# Patient Record
Sex: MALE | Race: WHITE | Employment: OTHER | URBAN - METROPOLITAN AREA
[De-identification: names, ages, dates, MRNs, and addresses within clinical notes are randomized per-mention and may not be internally consistent; named-entity substitution may affect disease eponyms.]

---

## 2015-09-11 LAB
LEFT VENTRICULAR EJECTION FRACTION MODE: NORMAL
LV EF: 65 %

## 2017-02-15 LAB
AVERAGE GLUCOSE: NORMAL
HBA1C MFR BLD: 5.4 %

## 2017-03-07 PROBLEM — Z95.1 S/P CABG X 3: Status: ACTIVE | Noted: 2017-03-07

## 2018-11-26 ENCOUNTER — APPOINTMENT (OUTPATIENT)
Dept: GENERAL RADIOLOGY | Age: 73
DRG: 281 | End: 2018-11-26
Payer: MEDICARE

## 2018-11-26 ENCOUNTER — HOSPITAL ENCOUNTER (INPATIENT)
Age: 73
LOS: 2 days | Discharge: HOME | DRG: 281 | End: 2018-11-28
Attending: EMERGENCY MEDICINE | Admitting: INTERNAL MEDICINE
Payer: MEDICARE

## 2018-11-26 DIAGNOSIS — R94.31 ACUTE ELECTROCARDIOGRAM CHANGES: ICD-10-CM

## 2018-11-26 DIAGNOSIS — R77.8 ELEVATED TROPONIN: Primary | ICD-10-CM

## 2018-11-26 PROBLEM — E78.2 MIXED HYPERLIPIDEMIA: Chronic | Status: ACTIVE | Noted: 2018-11-26

## 2018-11-26 PROBLEM — Z95.1 S/P CABG X 3: Chronic | Status: ACTIVE | Noted: 2017-03-07

## 2018-11-26 PROBLEM — I25.10 CAD (CORONARY ARTERY DISEASE), NATIVE CORONARY ARTERY: Chronic | Status: ACTIVE | Noted: 2018-11-26

## 2018-11-26 PROBLEM — I21.4 NON-STEMI (NON-ST ELEVATED MYOCARDIAL INFARCTION) (HCC): Status: ACTIVE | Noted: 2018-11-26

## 2018-11-26 LAB
ANION GAP SERPL CALCULATED.3IONS-SCNC: 11 MMOL/L (ref 7–16)
BASOPHILS ABSOLUTE: 0.04 E9/L (ref 0–0.2)
BASOPHILS RELATIVE PERCENT: 0.6 % (ref 0–2)
BUN BLDV-MCNC: 13 MG/DL (ref 8–23)
CALCIUM SERPL-MCNC: 9.2 MG/DL (ref 8.6–10.2)
CHLORIDE BLD-SCNC: 105 MMOL/L (ref 98–107)
CK MB: 7.3 NG/ML (ref 0–7.7)
CK MB: 78.1 NG/ML (ref 0–7.7)
CO2: 26 MMOL/L (ref 22–29)
CREAT SERPL-MCNC: 1.2 MG/DL (ref 0.7–1.2)
EOSINOPHILS ABSOLUTE: 0.05 E9/L (ref 0.05–0.5)
EOSINOPHILS RELATIVE PERCENT: 0.7 % (ref 0–6)
GFR AFRICAN AMERICAN: >60
GFR NON-AFRICAN AMERICAN: 59 ML/MIN/1.73
GLUCOSE BLD-MCNC: 119 MG/DL (ref 74–99)
HCT VFR BLD CALC: 44.3 % (ref 37–54)
HEMOGLOBIN: 14.9 G/DL (ref 12.5–16.5)
IMMATURE GRANULOCYTES #: 0.03 E9/L
IMMATURE GRANULOCYTES %: 0.4 % (ref 0–5)
LYMPHOCYTES ABSOLUTE: 1.11 E9/L (ref 1.5–4)
LYMPHOCYTES RELATIVE PERCENT: 16.5 % (ref 20–42)
MCH RBC QN AUTO: 31.5 PG (ref 26–35)
MCHC RBC AUTO-ENTMCNC: 33.6 % (ref 32–34.5)
MCV RBC AUTO: 93.7 FL (ref 80–99.9)
MONOCYTES ABSOLUTE: 0.56 E9/L (ref 0.1–0.95)
MONOCYTES RELATIVE PERCENT: 8.3 % (ref 2–12)
NEUTROPHILS ABSOLUTE: 4.93 E9/L (ref 1.8–7.3)
NEUTROPHILS RELATIVE PERCENT: 73.5 % (ref 43–80)
PDW BLD-RTO: 12.5 FL (ref 11.5–15)
PLATELET # BLD: 183 E9/L (ref 130–450)
PMV BLD AUTO: 9.9 FL (ref 7–12)
POTASSIUM REFLEX MAGNESIUM: 3.8 MMOL/L (ref 3.5–5)
RBC # BLD: 4.73 E12/L (ref 3.8–5.8)
SODIUM BLD-SCNC: 142 MMOL/L (ref 132–146)
TOTAL CK: 176 U/L (ref 20–200)
TOTAL CK: 728 U/L (ref 20–200)
TROPONIN: 0.04 NG/ML (ref 0–0.03)
TROPONIN: 0.56 NG/ML (ref 0–0.03)
WBC # BLD: 6.7 E9/L (ref 4.5–11.5)

## 2018-11-26 PROCEDURE — 6360000002 HC RX W HCPCS: Performed by: INTERNAL MEDICINE

## 2018-11-26 PROCEDURE — 6370000000 HC RX 637 (ALT 250 FOR IP): Performed by: INTERNAL MEDICINE

## 2018-11-26 PROCEDURE — 99223 1ST HOSP IP/OBS HIGH 75: CPT | Performed by: INTERNAL MEDICINE

## 2018-11-26 PROCEDURE — 36415 COLL VENOUS BLD VENIPUNCTURE: CPT

## 2018-11-26 PROCEDURE — 84484 ASSAY OF TROPONIN QUANT: CPT

## 2018-11-26 PROCEDURE — 6370000000 HC RX 637 (ALT 250 FOR IP): Performed by: STUDENT IN AN ORGANIZED HEALTH CARE EDUCATION/TRAINING PROGRAM

## 2018-11-26 PROCEDURE — 71045 X-RAY EXAM CHEST 1 VIEW: CPT

## 2018-11-26 PROCEDURE — 85025 COMPLETE CBC W/AUTO DIFF WBC: CPT

## 2018-11-26 PROCEDURE — 82550 ASSAY OF CK (CPK): CPT

## 2018-11-26 PROCEDURE — 99285 EMERGENCY DEPT VISIT HI MDM: CPT

## 2018-11-26 PROCEDURE — 2580000003 HC RX 258: Performed by: INTERNAL MEDICINE

## 2018-11-26 PROCEDURE — 80048 BASIC METABOLIC PNL TOTAL CA: CPT

## 2018-11-26 PROCEDURE — 2060000000 HC ICU INTERMEDIATE R&B

## 2018-11-26 PROCEDURE — 82553 CREATINE MB FRACTION: CPT

## 2018-11-26 PROCEDURE — 93005 ELECTROCARDIOGRAM TRACING: CPT | Performed by: EMERGENCY MEDICINE

## 2018-11-26 RX ORDER — ACETAMINOPHEN 325 MG/1
650 TABLET ORAL EVERY 4 HOURS PRN
Status: DISCONTINUED | OUTPATIENT
Start: 2018-11-26 | End: 2018-11-27

## 2018-11-26 RX ORDER — ASPIRIN 81 MG/1
81 TABLET, CHEWABLE ORAL DAILY
Status: DISCONTINUED | OUTPATIENT
Start: 2018-11-26 | End: 2018-11-28 | Stop reason: HOSPADM

## 2018-11-26 RX ORDER — LEVOTHYROXINE SODIUM 0.05 MG/1
50 TABLET ORAL DAILY
Status: DISCONTINUED | OUTPATIENT
Start: 2018-11-26 | End: 2018-11-28 | Stop reason: HOSPADM

## 2018-11-26 RX ORDER — ONDANSETRON 2 MG/ML
4 INJECTION INTRAMUSCULAR; INTRAVENOUS EVERY 6 HOURS PRN
Status: DISCONTINUED | OUTPATIENT
Start: 2018-11-26 | End: 2018-11-27

## 2018-11-26 RX ORDER — NITROGLYCERIN 0.4 MG/1
0.4 TABLET SUBLINGUAL ONCE
Status: COMPLETED | OUTPATIENT
Start: 2018-11-26 | End: 2018-11-26

## 2018-11-26 RX ORDER — ASPIRIN 81 MG/1
324 TABLET, CHEWABLE ORAL ONCE
Status: COMPLETED | OUTPATIENT
Start: 2018-11-26 | End: 2018-11-26

## 2018-11-26 RX ORDER — ROSUVASTATIN CALCIUM 5 MG/1
5 TABLET, COATED ORAL
Status: DISCONTINUED | OUTPATIENT
Start: 2018-11-26 | End: 2018-11-28 | Stop reason: HOSPADM

## 2018-11-26 RX ORDER — SODIUM CHLORIDE 0.9 % (FLUSH) 0.9 %
10 SYRINGE (ML) INJECTION EVERY 12 HOURS SCHEDULED
Status: DISCONTINUED | OUTPATIENT
Start: 2018-11-26 | End: 2018-11-27

## 2018-11-26 RX ORDER — SODIUM CHLORIDE 9 MG/ML
INJECTION, SOLUTION INTRAVENOUS CONTINUOUS
Status: DISCONTINUED | OUTPATIENT
Start: 2018-11-26 | End: 2018-11-27

## 2018-11-26 RX ORDER — SODIUM CHLORIDE 0.9 % (FLUSH) 0.9 %
10 SYRINGE (ML) INJECTION PRN
Status: DISCONTINUED | OUTPATIENT
Start: 2018-11-26 | End: 2018-11-27

## 2018-11-26 RX ORDER — NITROGLYCERIN 0.4 MG/1
0.4 TABLET SUBLINGUAL EVERY 5 MIN PRN
Status: DISCONTINUED | OUTPATIENT
Start: 2018-11-26 | End: 2018-11-27

## 2018-11-26 RX ADMIN — METOPROLOL TARTRATE 25 MG: 25 TABLET ORAL at 22:31

## 2018-11-26 RX ADMIN — NITROGLYCERIN 1 INCH: 20 OINTMENT TOPICAL at 14:41

## 2018-11-26 RX ADMIN — ENOXAPARIN SODIUM 40 MG: 40 INJECTION SUBCUTANEOUS at 11:06

## 2018-11-26 RX ADMIN — ASPIRIN 324 MG: 81 TABLET, CHEWABLE ORAL at 07:11

## 2018-11-26 RX ADMIN — NITROGLYCERIN 1 INCH: 20 OINTMENT TOPICAL at 22:32

## 2018-11-26 RX ADMIN — Medication 10 ML: at 22:32

## 2018-11-26 RX ADMIN — ROSUVASTATIN CALCIUM 5 MG: 5 TABLET, FILM COATED ORAL at 11:17

## 2018-11-26 RX ADMIN — SODIUM CHLORIDE: 9 INJECTION, SOLUTION INTRAVENOUS at 22:32

## 2018-11-26 RX ADMIN — NITROGLYCERIN 0.4 MG: 0.4 TABLET SUBLINGUAL at 07:23

## 2018-11-26 RX ADMIN — ENOXAPARIN SODIUM 100 MG: 100 INJECTION SUBCUTANEOUS at 13:45

## 2018-11-26 RX ADMIN — Medication 10 ML: at 11:06

## 2018-11-26 RX ADMIN — METOPROLOL TARTRATE 25 MG: 25 TABLET ORAL at 13:46

## 2018-11-26 RX ADMIN — NITROGLYCERIN 1 INCH: 20 OINTMENT TOPICAL at 19:06

## 2018-11-26 ASSESSMENT — PAIN SCALES - GENERAL
PAINLEVEL_OUTOF10: 0
PAINLEVEL_OUTOF10: 3
PAINLEVEL_OUTOF10: 0
PAINLEVEL_OUTOF10: 6
PAINLEVEL_OUTOF10: 0
PAINLEVEL_OUTOF10: 0

## 2018-11-26 ASSESSMENT — ENCOUNTER SYMPTOMS
ABDOMINAL PAIN: 0
SINUS PRESSURE: 0
DIARRHEA: 0
EYE DISCHARGE: 0
SORE THROAT: 0
SHORTNESS OF BREATH: 0
EYE REDNESS: 0
BACK PAIN: 1
COUGH: 0
VOMITING: 0
EYE PAIN: 0
NAUSEA: 0
WHEEZING: 0

## 2018-11-26 ASSESSMENT — PAIN DESCRIPTION - ORIENTATION: ORIENTATION: UPPER

## 2018-11-26 ASSESSMENT — PAIN DESCRIPTION - DESCRIPTORS
DESCRIPTORS: TIGHTNESS;ACHING
DESCRIPTORS: ACHING

## 2018-11-26 ASSESSMENT — PAIN DESCRIPTION - PAIN TYPE
TYPE: ACUTE PAIN
TYPE: ACUTE PAIN

## 2018-11-26 ASSESSMENT — PAIN DESCRIPTION - LOCATION
LOCATION: BACK;SHOULDER;CHEST
LOCATION: BACK;SHOULDER

## 2018-11-26 NOTE — H&P
has no known allergies. Social History:    reports that he quit smoking about 33 years ago. His smoking use included Cigarettes. He has a 30.00 pack-year smoking history. He quit smokeless tobacco use about 28 years ago. His smokeless tobacco use included Chew. He reports that he does not drink alcohol or use drugs. Family History:   family history includes Heart Attack in his father. REVIEW OF SYSTEMS:  As above in the HPI, otherwise negative    PHYSICAL EXAM:    Vitals:  BP (!) 146/62 Comment: Manual  Pulse 75   Temp 98.1 °F (36.7 °C) (Temporal)   Resp 18   Ht 6' 2\" (1.88 m)   Wt 215 lb 12.8 oz (97.9 kg)   SpO2 97%   BMI 27.71 kg/m²     General:  Awake, alert, oriented X 3. Well developed, well nourished, well groomed. No apparent distress. HEENT:  Normocephalic, atraumatic. No scleral icterus. No conjunctival injection. Normal lips, teeth, and gums. No nasal discharge. Neck:  Supple  Heart:  RRR, no murmurs, gallops, or rubs  Lungs:  CTA bilaterally, bilat symmetrical expansion, no wheeze, rales, or rhonchi  Abdomen:   Bowel sounds present, soft, non distended, nontender, no masses, no organomegaly, no peritoneal signs  Extremities:  No clubbing, cyanosis, or edema  Skin:  Warm and dry, no open lesions or rash  Neuro:  Cranial nerves 2-12 intact, no focal deficits  Breast: deferred  Rectal: deferred  Genitalia:  deferred    LABS:  CBC:   Lab Results   Component Value Date    WBC 6.7 11/26/2018    RBC 4.73 11/26/2018    HGB 14.9 11/26/2018    HCT 44.3 11/26/2018    MCV 93.7 11/26/2018    MCH 31.5 11/26/2018    MCHC 33.6 11/26/2018    RDW 12.5 11/26/2018     11/26/2018    MPV 9.9 11/26/2018     BMP:    Lab Results   Component Value Date     11/26/2018    K 3.8 11/26/2018     11/26/2018    CO2 26 11/26/2018    BUN 13 11/26/2018    CREATININE 1.2 11/26/2018    CALCIUM 9.2 11/26/2018    GFRAA >60 11/26/2018    LABGLOM 59 11/26/2018    GLUCOSE 119 11/26/2018       Last 3

## 2018-11-26 NOTE — ED PROVIDER NOTES
Normal range of motion. Neck supple. Cardiovascular: Normal rate and regular rhythm. Pulmonary/Chest: Effort normal and breath sounds normal. No respiratory distress. He has no wheezes. He has no rales. Abdominal: Soft. Bowel sounds are normal. There is no tenderness. There is no rebound and no guarding. Musculoskeletal: He exhibits no edema. Neurological: He is alert and oriented to person, place, and time. No cranial nerve deficit. Coordination normal.   Skin: Skin is warm and dry. Nursing note and vitals reviewed. Procedures    MDM  Number of Diagnoses or Management Options  Diagnosis management comments: Patient appears comfortable at this time. He said his chest pain has been improving since this morning. He has an extensive history of CAD, CABG, diabetes. EKG does show changes from last time - possible concern for posterior STEMI. Will keep a close eye on him and repeat EKGs. Nitro and Aspirin ordered. Will reassess once medicated. CXR also ordered to r/o dissection. May need CTA. ED Course as of Nov 26 0906   Mon Nov 26, 2018   0840 Speaking with patient again, he states he never had any chest pain it has all been in his back but with his cardiac history he wasn't sure so wanted to come in to get checked out. [AL]      ED Course User Index  [AL] Guillermo Blanco DO     ED Course as of Nov 26 0907   Mon Nov 26, 2018   0840 Speaking with patient again, he states he never had any chest pain it has all been in his back but with his cardiac history he wasn't sure so wanted to come in to get checked out. [AL]      ED Course User Index  [AL] Guillermo Blanco DO       --------------------------------------------- PAST HISTORY ---------------------------------------------  Past Medical History:  has a past medical history of Atrial fibrillation ([address] CAD (coronary artery disease); Hyperlipidemia; Hypothyroid; Kidney stone; Platelets decreased ([address] and S/P CABG x 3.     Past Surgical History:  has a past surgical history that includes Cholecystectomy; hernia repair; Cardiac catheterization (9/11/15); Coronary artery bypass graft (9/14/15); Colonoscopy; and Colonoscopy (10/23/2017). Social History:  reports that he quit smoking about 33 years ago. His smoking use included Cigarettes. He has a 30.00 pack-year smoking history. He quit smokeless tobacco use about 28 years ago. His smokeless tobacco use included Chew. He reports that he does not drink alcohol or use drugs. Family History: family history includes Heart Attack in his father. The patients home medications have been reviewed. Allergies: Patient has no known allergies.     -------------------------------------------------- RESULTS -------------------------------------------------    LABS:  Results for orders placed or performed during the hospital encounter of 11/26/18   CBC Auto Differential   Result Value Ref Range    WBC 6.7 4.5 - 11.5 E9/L    RBC 4.73 3.80 - 5.80 E12/L    Hemoglobin 14.9 12.5 - 16.5 g/dL    Hematocrit 44.3 37.0 - 54.0 %    MCV 93.7 80.0 - 99.9 fL    MCH 31.5 26.0 - 35.0 pg    MCHC 33.6 32.0 - 34.5 %    RDW 12.5 11.5 - 15.0 fL    Platelets 370 239 - 229 E9/L    MPV 9.9 7.0 - 12.0 fL    Neutrophils % 73.5 43.0 - 80.0 %    Immature Granulocytes % 0.4 0.0 - 5.0 %    Lymphocytes % 16.5 (L) 20.0 - 42.0 %    Monocytes % 8.3 2.0 - 12.0 %    Eosinophils % 0.7 0.0 - 6.0 %    Basophils % 0.6 0.0 - 2.0 %    Neutrophils # 4.93 1.80 - 7.30 E9/L    Immature Granulocytes # 0.03 E9/L    Lymphocytes # 1.11 (L) 1.50 - 4.00 E9/L    Monocytes # 0.56 0.10 - 0.95 E9/L    Eosinophils # 0.05 0.05 - 0.50 E9/L    Basophils # 0.04 0.00 - 0.20 D8/X   Basic Metabolic Panel w/ Reflex to MG   Result Value Ref Range    Sodium 142 132 - 146 mmol/L    Potassium reflex Magnesium 3.8 3.5 - 5.0 mmol/L    Chloride 105 98 - 107 mmol/L    CO2 26 22 - 29 mmol/L    Anion Gap 11 7 - 16 mmol/L    Glucose 119 (H) 74 - 99 mg/dL    BUN 13 8 - 23 mg/dL    CREATININE

## 2018-11-26 NOTE — CONSULTS
Consults   Dictated  Back pain unlike his known ischemic symptosm in past  No clear cut ischemia on ekg  Troponin not diagnostic  Should have 24 hour observation, repeat enzymes with ck and mb added

## 2018-11-26 NOTE — PLAN OF CARE
Problem: Tissue Perfusion - Cardiopulmonary, Altered:  Goal: Absence of angina  Absence of angina   Outcome: Met This Shift

## 2018-11-27 LAB
ABO/RH: NORMAL
ANION GAP SERPL CALCULATED.3IONS-SCNC: 12 MMOL/L (ref 7–16)
ANTIBODY SCREEN: NORMAL
BUN BLDV-MCNC: 11 MG/DL (ref 8–23)
CALCIUM SERPL-MCNC: 8.7 MG/DL (ref 8.6–10.2)
CHLORIDE BLD-SCNC: 106 MMOL/L (ref 98–107)
CHOLESTEROL, TOTAL: 112 MG/DL (ref 0–199)
CO2: 24 MMOL/L (ref 22–29)
CREAT SERPL-MCNC: 1.1 MG/DL (ref 0.7–1.2)
EKG ATRIAL RATE: 66 BPM
EKG ATRIAL RATE: 66 BPM
EKG ATRIAL RATE: 78 BPM
EKG P AXIS: 23 DEGREES
EKG P AXIS: 30 DEGREES
EKG P AXIS: 49 DEGREES
EKG P-R INTERVAL: 140 MS
EKG P-R INTERVAL: 158 MS
EKG P-R INTERVAL: 162 MS
EKG Q-T INTERVAL: 426 MS
EKG Q-T INTERVAL: 440 MS
EKG Q-T INTERVAL: 440 MS
EKG QRS DURATION: 134 MS
EKG QRS DURATION: 136 MS
EKG QRS DURATION: 158 MS
EKG QTC CALCULATION (BAZETT): 461 MS
EKG QTC CALCULATION (BAZETT): 461 MS
EKG QTC CALCULATION (BAZETT): 485 MS
EKG R AXIS: -36 DEGREES
EKG R AXIS: -42 DEGREES
EKG R AXIS: 15 DEGREES
EKG T AXIS: 58 DEGREES
EKG T AXIS: 64 DEGREES
EKG T AXIS: 88 DEGREES
EKG VENTRICULAR RATE: 66 BPM
EKG VENTRICULAR RATE: 66 BPM
EKG VENTRICULAR RATE: 78 BPM
GFR AFRICAN AMERICAN: >60
GFR NON-AFRICAN AMERICAN: >60 ML/MIN/1.73
GLUCOSE BLD-MCNC: 120 MG/DL (ref 74–99)
HCT VFR BLD CALC: 41.7 % (ref 37–54)
HDLC SERPL-MCNC: 35 MG/DL
HEMOGLOBIN: 13.8 G/DL (ref 12.5–16.5)
LDL CHOLESTEROL CALCULATED: 55 MG/DL (ref 0–99)
MCH RBC QN AUTO: 31.1 PG (ref 26–35)
MCHC RBC AUTO-ENTMCNC: 33.1 % (ref 32–34.5)
MCV RBC AUTO: 93.9 FL (ref 80–99.9)
METER GLUCOSE: 138 MG/DL (ref 74–99)
PDW BLD-RTO: 12.5 FL (ref 11.5–15)
PLATELET # BLD: 165 E9/L (ref 130–450)
PMV BLD AUTO: 10 FL (ref 7–12)
POTASSIUM SERPL-SCNC: 4.1 MMOL/L (ref 3.5–5)
RBC # BLD: 4.44 E12/L (ref 3.8–5.8)
SODIUM BLD-SCNC: 142 MMOL/L (ref 132–146)
TRIGL SERPL-MCNC: 108 MG/DL (ref 0–149)
TROPONIN: 5.33 NG/ML (ref 0–0.03)
VLDLC SERPL CALC-MCNC: 22 MG/DL
WBC # BLD: 9.3 E9/L (ref 4.5–11.5)

## 2018-11-27 PROCEDURE — 80048 BASIC METABOLIC PNL TOTAL CA: CPT

## 2018-11-27 PROCEDURE — 4A023N7 MEASUREMENT OF CARDIAC SAMPLING AND PRESSURE, LEFT HEART, PERCUTANEOUS APPROACH: ICD-10-PCS | Performed by: INTERNAL MEDICINE

## 2018-11-27 PROCEDURE — 99024 POSTOP FOLLOW-UP VISIT: CPT | Performed by: INTERNAL MEDICINE

## 2018-11-27 PROCEDURE — 93459 L HRT ART/GRFT ANGIO: CPT | Performed by: INTERNAL MEDICINE

## 2018-11-27 PROCEDURE — 86901 BLOOD TYPING SEROLOGIC RH(D): CPT

## 2018-11-27 PROCEDURE — 86850 RBC ANTIBODY SCREEN: CPT

## 2018-11-27 PROCEDURE — B2131ZZ FLUOROSCOPY OF MULTIPLE CORONARY ARTERY BYPASS GRAFTS USING LOW OSMOLAR CONTRAST: ICD-10-PCS | Performed by: INTERNAL MEDICINE

## 2018-11-27 PROCEDURE — 2500000003 HC RX 250 WO HCPCS

## 2018-11-27 PROCEDURE — 6360000002 HC RX W HCPCS

## 2018-11-27 PROCEDURE — 6360000002 HC RX W HCPCS: Performed by: INTERNAL MEDICINE

## 2018-11-27 PROCEDURE — 2709999900 HC NON-CHARGEABLE SUPPLY

## 2018-11-27 PROCEDURE — B2111ZZ FLUOROSCOPY OF MULTIPLE CORONARY ARTERIES USING LOW OSMOLAR CONTRAST: ICD-10-PCS | Performed by: INTERNAL MEDICINE

## 2018-11-27 PROCEDURE — 36415 COLL VENOUS BLD VENIPUNCTURE: CPT

## 2018-11-27 PROCEDURE — 82962 GLUCOSE BLOOD TEST: CPT

## 2018-11-27 PROCEDURE — 6370000000 HC RX 637 (ALT 250 FOR IP): Performed by: INTERNAL MEDICINE

## 2018-11-27 PROCEDURE — 85027 COMPLETE CBC AUTOMATED: CPT

## 2018-11-27 PROCEDURE — C1760 CLOSURE DEV, VASC: HCPCS

## 2018-11-27 PROCEDURE — 99152 MOD SED SAME PHYS/QHP 5/>YRS: CPT | Performed by: INTERNAL MEDICINE

## 2018-11-27 PROCEDURE — 3E073PZ INTRODUCTION OF PLATELET INHIBITOR INTO CORONARY ARTERY, PERCUTANEOUS APPROACH: ICD-10-PCS | Performed by: INTERNAL MEDICINE

## 2018-11-27 PROCEDURE — B2151ZZ FLUOROSCOPY OF LEFT HEART USING LOW OSMOLAR CONTRAST: ICD-10-PCS | Performed by: INTERNAL MEDICINE

## 2018-11-27 PROCEDURE — C1769 GUIDE WIRE: HCPCS

## 2018-11-27 PROCEDURE — C1894 INTRO/SHEATH, NON-LASER: HCPCS

## 2018-11-27 PROCEDURE — 2060000000 HC ICU INTERMEDIATE R&B

## 2018-11-27 PROCEDURE — 2580000003 HC RX 258: Performed by: INTERNAL MEDICINE

## 2018-11-27 PROCEDURE — 80061 LIPID PANEL: CPT

## 2018-11-27 PROCEDURE — 84484 ASSAY OF TROPONIN QUANT: CPT

## 2018-11-27 PROCEDURE — 86900 BLOOD TYPING SEROLOGIC ABO: CPT

## 2018-11-27 PROCEDURE — 6370000000 HC RX 637 (ALT 250 FOR IP)

## 2018-11-27 PROCEDURE — 93005 ELECTROCARDIOGRAM TRACING: CPT | Performed by: INTERNAL MEDICINE

## 2018-11-27 RX ORDER — CLOPIDOGREL BISULFATE 75 MG/1
75 TABLET ORAL DAILY
Status: DISCONTINUED | OUTPATIENT
Start: 2018-11-28 | End: 2018-11-28 | Stop reason: HOSPADM

## 2018-11-27 RX ORDER — SODIUM CHLORIDE 9 MG/ML
INJECTION, SOLUTION INTRAVENOUS ONCE
Status: COMPLETED | OUTPATIENT
Start: 2018-11-27 | End: 2018-11-27

## 2018-11-27 RX ORDER — METOPROLOL SUCCINATE 25 MG/1
25 TABLET, EXTENDED RELEASE ORAL DAILY
Status: DISCONTINUED | OUTPATIENT
Start: 2018-11-27 | End: 2018-11-28 | Stop reason: HOSPADM

## 2018-11-27 RX ADMIN — ONDANSETRON 4 MG: 2 INJECTION INTRAMUSCULAR; INTRAVENOUS at 01:27

## 2018-11-27 RX ADMIN — METOPROLOL TARTRATE 25 MG: 25 TABLET ORAL at 05:59

## 2018-11-27 RX ADMIN — LEVOTHYROXINE SODIUM 50 MCG: 50 TABLET ORAL at 05:59

## 2018-11-27 RX ADMIN — SODIUM CHLORIDE: 9 INJECTION, SOLUTION INTRAVENOUS at 08:01

## 2018-11-27 RX ADMIN — ENOXAPARIN SODIUM 100 MG: 100 INJECTION SUBCUTANEOUS at 02:36

## 2018-11-27 RX ADMIN — NITROGLYCERIN 1 INCH: 20 OINTMENT TOPICAL at 05:59

## 2018-11-27 RX ADMIN — METOPROLOL SUCCINATE 25 MG: 25 TABLET, FILM COATED, EXTENDED RELEASE ORAL at 11:21

## 2018-11-27 RX ADMIN — ASPIRIN 81 MG CHEWABLE TABLET 81 MG: 81 TABLET CHEWABLE at 05:58

## 2018-11-27 ASSESSMENT — PAIN SCALES - GENERAL
PAINLEVEL_OUTOF10: 0

## 2018-11-27 NOTE — PLAN OF CARE
Problem: Pain:  Goal: Pain level will decrease  Pain level will decrease   Outcome: Met This Shift      Problem: Tissue Perfusion - Cardiopulmonary, Altered:  Goal: Absence of angina  Absence of angina   Outcome: Met This Shift    Goal: Hemodynamic stability will improve  Hemodynamic stability will improve   Outcome: Not Met This Shift      Problem: Falls - Risk of: Intervention: Assess fall prevention measures  Pt is A&Ox4 and was a low falls risk, however he got very light headed after going to the bathroom and laid on the floor to prevent passing out. Due to this he is now a high falls risk and is aware he has a bed alarm on.     Goal: Will remain free from falls  Will remain free from falls   Outcome: Not Met This Shift    Goal: Absence of physical injury  Absence of physical injury   Outcome: Met This Shift

## 2018-11-27 NOTE — PROGRESS NOTES
PROGRESS NOTE     CARDIOLOGY    Chief complaint: Seen today for follow up, management & recommendations for nstemi  He denies chest pain or shortness of breath today. Wt Readings from Last 3 Encounters:   11/26/18 215 lb 12.8 oz (97.9 kg)   03/06/18 218 lb (98.9 kg)   10/23/17 214 lb (97.1 kg)     Temp Readings from Last 3 Encounters:   11/27/18 97.8 °F (36.6 °C) (Oral)   10/23/17 97 °F (36.1 °C) (Temporal)   09/30/15 97.4 °F (36.3 °C) (Temporal)     BP Readings from Last 3 Encounters:   11/27/18 114/61   03/06/18 132/68   10/23/17 (!) 110/56     Pulse Readings from Last 3 Encounters:   11/27/18 90   03/06/18 63   10/23/17 60         Intake/Output Summary (Last 24 hours) at 11/27/18 0658  Last data filed at 11/27/18 9223   Gross per 24 hour   Intake           806.25 ml   Output              600 ml   Net           206.25 ml       Recent Labs      11/26/18   0702   WBC  6.7   HGB  14.9   HCT  44.3   MCV  93.7   PLT  183     Recent Labs      11/26/18   0702   NA  142   K  3.8   CL  105   CO2  26   BUN  13   CREATININE  1.2     No results for input(s): PROTIME, INR in the last 72 hours. Recent Labs      11/26/18   0702  11/26/18   1144  11/27/18   0136   CKTOTAL  176  728*   --    CKMB  7.3  78.1*   --    TROPONINI  0.04*  0.56*  5.33*     No results for input(s): BNP in the last 72 hours. No results for input(s): CHOL, HDL, TRIG in the last 72 hours.     Invalid input(s): CHOLHDLR, LDLCALCU        aspirin chewable tablet 81 mg Daily   levothyroxine (SYNTHROID) tablet 50 mcg Daily   rosuvastatin (CRESTOR) tablet 5 mg Once per day on Mon Thu   sodium chloride flush 0.9 % injection 10 mL 2 times per day   sodium chloride flush 0.9 % injection 10 mL PRN   ondansetron (ZOFRAN) injection 4 mg Q6H PRN   acetaminophen (TYLENOL) tablet 650 mg Q4H PRN   nitroGLYCERIN (NITROSTAT) SL tablet 0.4 mg Q5 Min PRN   metoprolol tartrate (LOPRESSOR) tablet 25 mg TID   0.9 % sodium chloride infusion Continuous   nitroglycerin (NITRO-BID) 2 % ointment 1 inch 4 times per day       Review of systems:     Heart: as above   Lungs: as above   Eyes: denies changes in vision or discharge. Ears: denies changes in hearing or pain. Nose: denies epistaxis or masses   Throat: denies sore throat or trouble swallowing. Neuro: denies numbness, tingling, tremors. Skin: denies rashes or itching. : denies hematuria, dysuria   GI: denies vomiting, diarrhea   Psych: denies mood changed, anxiety, depression. Physical exam:    Constitutional: A&O x3, communicates well, no acute distress. Eyes: extraocular muscles intact, PERRL. Normal lids & conjunctiva. No icterus. ENT: clear, no bleeding. No external masses. Lips normal formation. Neck: supple, full ROM, no JVD, no bruits, no lymphadenopathy. No masses. trachea midline. Heart: regular rate & rhythm, normal S1 & S2, I/VI (normal physiologic) systolic murmur, S4 gallop. No heave. Lungs: CTA. No accessory muscles. Abd: soft, non-tender. Normal bowel sounds. Neuro: Full ROM X 4, EOMI, no tremors. EXT: No bilateral lower extremity edema  Skin: warm, dry, intact. Good turgor. Psych: A&O x 3, normal behavior, not anxious. Assessment/Recommendations  1. nstemi likely due to graft occlusion. For cath today.  procddure discussed with bailee

## 2018-11-27 NOTE — PROGRESS NOTES
Arrived to see patient but he was off the floor at the cath lab. Chart reviewed. Events of last night noted. Results of cath noted. Continue medical treatment as indicated by cardiology. Will see later on rounds if available.     Electronically signed by Sabrina Frank MD on 11/27/18 at 10:14 AM

## 2018-11-27 NOTE — SIGNIFICANT EVENT
Rapid Response Team Note  Date of event: 11/27/2018   Time of event: Ann Marie Pelletier 68y.o. year old male   YOB: 1945   Admit date:  11/26/2018   Location: 4270/6837-   Witnessed? : [x]Yes  [] No  Monitored? : [x]Yes  [] No  Code status: [x] Full  [] DNR-CCA  []DNR-CC  ______________________________________________________________________  Reason for RRT:    [] RR < 8     [] RR > 28   [] SpO2 <90%   [] HR < 40 bpm   [] HR > 130 bpm  [] SBP < 90 mmHg    [] SpO2 <90%   [] LOC   [] Seizures    [] Significant Bleeding Event    [x] Other: diaphoresis and near syncope    Subjective:   CTSP regarding the above event, patient with the hx of AFIB, CAD, hypothyroidism, HLD was admitted for chest pain and diagnosed to be NSTEMI. He is planned to get cath today, called RRT for near syncopal episode when going to the bathroom. Currently diaphoretic. Dr Deepa Ruiz seen him earlier, planned for cath.     Objective:   Vital signs: Temp: 97.5/BP: 117/65/RR: 16/HR: 78  Initial Condition:  Conscious   [x] Yes  [] No     Breathing [x] Yes  [] No     Pulse  [x] Yes  [] No    Airway:   [x] Open/ Clear     Intervention: [x] None  [] Pooled secretions     [] Suctioned  [] Stridor      [] Intubation    Lungs:   [x] Symmetrical chest rise/ CTABL Intervention: [x] None  [] Use of accessory muscles    [] NIV (CPAP/BiPAP)  [] Cyanosis      [] Nasal Oxygen/Mask  [] Wheezing       [] ABG             [] CXR  [] Other:   Circulation:   Rhythm:  [x] Sinus [] Other:   Intervention: [] None            [] IV Access  [] Peripheral              [] Central            [x] EKG            [] Cardioversion            [] Defibrillation     Capillary Refill:  [] > 2 seconds [] < 2 seconds    Neurologic:   [] NIHSS      [] Pupillary Response:     Response to pain:   [] Yes  [] No  Follow commands:  [] Yes  [] No  Facial asymmetry:  [] Yes  [] No  Motor strength:  [] Equal  [] Focal deficit:   Diagnostic Test:  Blood Sugar:  137 mg/dL  EKG:    No

## 2018-11-28 VITALS
TEMPERATURE: 98.5 F | RESPIRATION RATE: 16 BRPM | HEIGHT: 74 IN | BODY MASS INDEX: 27.7 KG/M2 | DIASTOLIC BLOOD PRESSURE: 61 MMHG | WEIGHT: 215.8 LBS | SYSTOLIC BLOOD PRESSURE: 108 MMHG | HEART RATE: 77 BPM | OXYGEN SATURATION: 97 %

## 2018-11-28 LAB
ANION GAP SERPL CALCULATED.3IONS-SCNC: 10 MMOL/L (ref 7–16)
BUN BLDV-MCNC: 12 MG/DL (ref 8–23)
CALCIUM SERPL-MCNC: 8.8 MG/DL (ref 8.6–10.2)
CHLORIDE BLD-SCNC: 104 MMOL/L (ref 98–107)
CO2: 25 MMOL/L (ref 22–29)
CREAT SERPL-MCNC: 1.2 MG/DL (ref 0.7–1.2)
GFR AFRICAN AMERICAN: >60
GFR NON-AFRICAN AMERICAN: 59 ML/MIN/1.73
GLUCOSE BLD-MCNC: 96 MG/DL (ref 74–99)
HCT VFR BLD CALC: 40.6 % (ref 37–54)
HEMOGLOBIN: 13.6 G/DL (ref 12.5–16.5)
MCH RBC QN AUTO: 32 PG (ref 26–35)
MCHC RBC AUTO-ENTMCNC: 33.5 % (ref 32–34.5)
MCV RBC AUTO: 95.5 FL (ref 80–99.9)
PDW BLD-RTO: 12.7 FL (ref 11.5–15)
PLATELET # BLD: 160 E9/L (ref 130–450)
PMV BLD AUTO: 10.2 FL (ref 7–12)
POTASSIUM SERPL-SCNC: 4.3 MMOL/L (ref 3.5–5)
RBC # BLD: 4.25 E12/L (ref 3.8–5.8)
SODIUM BLD-SCNC: 139 MMOL/L (ref 132–146)
WBC # BLD: 7.4 E9/L (ref 4.5–11.5)

## 2018-11-28 PROCEDURE — 80048 BASIC METABOLIC PNL TOTAL CA: CPT

## 2018-11-28 PROCEDURE — 36415 COLL VENOUS BLD VENIPUNCTURE: CPT

## 2018-11-28 PROCEDURE — 99232 SBSQ HOSP IP/OBS MODERATE 35: CPT | Performed by: INTERNAL MEDICINE

## 2018-11-28 PROCEDURE — 85027 COMPLETE CBC AUTOMATED: CPT

## 2018-11-28 PROCEDURE — 6370000000 HC RX 637 (ALT 250 FOR IP): Performed by: INTERNAL MEDICINE

## 2018-11-28 RX ORDER — CLOPIDOGREL BISULFATE 75 MG/1
75 TABLET ORAL DAILY
Qty: 30 TABLET | Refills: 3 | Status: SHIPPED | OUTPATIENT
Start: 2018-11-29 | End: 2019-06-06 | Stop reason: CLARIF

## 2018-11-28 RX ORDER — METOPROLOL SUCCINATE 25 MG/1
25 TABLET, EXTENDED RELEASE ORAL DAILY
Qty: 30 TABLET | Refills: 3 | Status: SHIPPED | OUTPATIENT
Start: 2018-11-29 | End: 2019-06-06 | Stop reason: SDUPTHER

## 2018-11-28 RX ADMIN — ASPIRIN 81 MG CHEWABLE TABLET 81 MG: 81 TABLET CHEWABLE at 08:37

## 2018-11-28 RX ADMIN — METOPROLOL SUCCINATE 25 MG: 25 TABLET, FILM COATED, EXTENDED RELEASE ORAL at 08:37

## 2018-11-28 RX ADMIN — LEVOTHYROXINE SODIUM 50 MCG: 50 TABLET ORAL at 06:55

## 2018-11-28 RX ADMIN — CLOPIDOGREL 75 MG: 75 TABLET, FILM COATED ORAL at 08:37

## 2018-11-28 ASSESSMENT — PAIN SCALES - GENERAL: PAINLEVEL_OUTOF10: 0

## 2018-11-28 NOTE — PROGRESS NOTES
sore throat or trouble swallowing. Neuro: denies numbness, tingling, tremors. Skin: denies rashes or itching. : denies hematuria, dysuria   GI: denies vomiting, diarrhea   Psych: denies mood changed, anxiety, depression. Physical exam:    Constitutional: A&O x3, communicates well, no acute distress. Eyes: extraocular muscles intact, PERRL. Normal lids & conjunctiva. No icterus. ENT: clear, no bleeding. No external masses. Lips normal formation. Neck: supple, full ROM, no JVD, no bruits, no lymphadenopathy. No masses. trachea midline. Heart: regular rate & rhythm, normal S1 & S2, I/VI (normal physiologic) systolic murmur, S4 gallop. No heave. Lungs: CTA. No accessory muscles. Abd: soft, non-tender. Normal bowel sounds. Neuro: Full ROM X 4, EOMI, no tremors. EXT: No bilateral lower extremity edema  Skin: warm, dry, intact. Good turgor. Psych: A&O x 3, normal behavior, not anxious. Assessment/Recommendations  1. nstemi : see cath report. meds only.  [facility] for [facility]

## 2018-11-28 NOTE — DISCHARGE SUMMARY
Physician Discharge Summary     Patient ID:  Malcolm Obrien  [id number]  80 y.o.  1945    Admit date: 11/26/2018    Discharge date and time:  11/28/2018    Admission Diagnoses:   Patient Active Problem List   Diagnosis    [facility]    Hypothyroidism    NSTEMI (non-ST elevated myocardial infarction) [facility]    Obesity    S/P CABG x 3    Mixed hyperlipidemia    CAD (coronary artery disease), native coronary artery    Non-STEMI (non-ST elevated myocardial infarction) [facility])       Discharge Diagnoses: nSTEMI    Consults: cardiology    Procedures: Left heart catheterization    Hospital Course: NSTEMI sp [facility] 11/27   - medical mgmt per cardiology  D/C home stable condition    Discharge Exam:  See progress note from today    Disposition: home    Patient Instructions:   Current Discharge Medication List      START taking these medications    Details   metoprolol succinate (TOPROL XL) 25 MG extended release tablet Take 1 tablet by mouth daily  Qty: 30 tablet, Refills: 3      clopidogrel (PLAVIX) 75 MG tablet Take 1 tablet by mouth daily  Qty: 30 tablet, Refills: 3         CONTINUE these medications which have NOT CHANGED    Details   rosuvastatin (CRESTOR) 10 MG tablet Take 5 mg by mouth Twice a Week       vitamin D (ERGOCALCIFEROL) 48690 UNITS CAPS capsule Take 50,000 Units by mouth once a week       aspirin 81 MG chewable tablet Take 1 tablet by mouth daily  Qty: 30 tablet, Refills: 3      levothyroxine (SYNTHROID) 50 MCG tablet Take 50 mcg by mouth daily.              Activity: activity as tolerated  Diet: cardiac diet    Follow-up with PCP 1 week, cardiology as scheduled    Signed:  Mavis Senior MD  11/28/2018  11:32 AM

## 2018-11-28 NOTE — PLAN OF CARE
Problem: Tissue Perfusion - Cardiopulmonary, Altered:  Goal: Absence of angina  Absence of angina   Outcome: Met This Shift    Goal: Hemodynamic stability will improve  Hemodynamic stability will improve   Outcome: Met This Shift      Problem: Falls - Risk of:  Goal: Will remain free from falls  Will remain free from falls   Outcome: Met This Shift    Goal: Absence of physical injury  Absence of physical injury   Outcome: Met This Shift      Problem:  Activity Intolerance - Risk of  Goal: Ambulates independently  Outcome: Met This Shift

## 2018-11-30 ENCOUNTER — APPOINTMENT (OUTPATIENT)
Dept: ULTRASOUND IMAGING | Age: 73
End: 2018-11-30
Payer: MEDICARE

## 2018-11-30 ENCOUNTER — TELEPHONE (OUTPATIENT)
Dept: ADMINISTRATIVE | Age: 73
End: 2018-11-30

## 2018-11-30 ENCOUNTER — HOSPITAL ENCOUNTER (EMERGENCY)
Age: 73
Discharge: HOME | End: 2018-11-30
Attending: EMERGENCY MEDICINE
Payer: MEDICARE

## 2018-11-30 VITALS
HEART RATE: 72 BPM | RESPIRATION RATE: 14 BRPM | HEIGHT: 74 IN | OXYGEN SATURATION: 99 % | SYSTOLIC BLOOD PRESSURE: 124 MMHG | WEIGHT: 215 LBS | DIASTOLIC BLOOD PRESSURE: 66 MMHG | TEMPERATURE: 98 F | BODY MASS INDEX: 27.59 KG/M2

## 2018-11-30 DIAGNOSIS — I97.630 POSTOPERATIVE HEMATOMA INVOLVING CIRCULATORY SYSTEM FOLLOWING CARDIAC CATHETERIZATION: Primary | ICD-10-CM

## 2018-11-30 LAB
BASOPHILS ABSOLUTE: 0.02 E9/L (ref 0–0.2)
BASOPHILS RELATIVE PERCENT: 0.3 % (ref 0–2)
EKG ATRIAL RATE: 69 BPM
EKG P AXIS: 35 DEGREES
EKG P-R INTERVAL: 150 MS
EKG Q-T INTERVAL: 414 MS
EKG QRS DURATION: 130 MS
EKG QTC CALCULATION (BAZETT): 443 MS
EKG R AXIS: -5 DEGREES
EKG T AXIS: -39 DEGREES
EKG VENTRICULAR RATE: 69 BPM
EOSINOPHILS ABSOLUTE: 0.05 E9/L (ref 0.05–0.5)
EOSINOPHILS RELATIVE PERCENT: 0.8 % (ref 0–6)
HCT VFR BLD CALC: 41.5 % (ref 37–54)
HEMOGLOBIN: 13.6 G/DL (ref 12.5–16.5)
IMMATURE GRANULOCYTES #: 0.03 E9/L
IMMATURE GRANULOCYTES %: 0.5 % (ref 0–5)
LYMPHOCYTES ABSOLUTE: 1.15 E9/L (ref 1.5–4)
LYMPHOCYTES RELATIVE PERCENT: 18.6 % (ref 20–42)
MCH RBC QN AUTO: 31.3 PG (ref 26–35)
MCHC RBC AUTO-ENTMCNC: 32.8 % (ref 32–34.5)
MCV RBC AUTO: 95.4 FL (ref 80–99.9)
MONOCYTES ABSOLUTE: 0.6 E9/L (ref 0.1–0.95)
MONOCYTES RELATIVE PERCENT: 9.7 % (ref 2–12)
NEUTROPHILS ABSOLUTE: 4.34 E9/L (ref 1.8–7.3)
NEUTROPHILS RELATIVE PERCENT: 70.1 % (ref 43–80)
PDW BLD-RTO: 12.5 FL (ref 11.5–15)
PLATELET # BLD: 180 E9/L (ref 130–450)
PMV BLD AUTO: 10.2 FL (ref 7–12)
RBC # BLD: 4.35 E12/L (ref 3.8–5.8)
WBC # BLD: 6.2 E9/L (ref 4.5–11.5)

## 2018-11-30 PROCEDURE — 99284 EMERGENCY DEPT VISIT MOD MDM: CPT

## 2018-11-30 PROCEDURE — 85025 COMPLETE CBC W/AUTO DIFF WBC: CPT

## 2018-11-30 PROCEDURE — 93926 LOWER EXTREMITY STUDY: CPT

## 2018-11-30 PROCEDURE — 36415 COLL VENOUS BLD VENIPUNCTURE: CPT

## 2018-11-30 RX ORDER — 0.9 % SODIUM CHLORIDE 0.9 %
1000 INTRAVENOUS SOLUTION INTRAVENOUS ONCE
Status: DISCONTINUED | OUTPATIENT
Start: 2018-11-30 | End: 2018-11-30

## 2018-11-30 RX ORDER — ONDANSETRON 4 MG/1
4 TABLET, ORALLY DISINTEGRATING ORAL ONCE
Status: DISCONTINUED | OUTPATIENT
Start: 2018-11-30 | End: 2018-11-30

## 2018-11-30 RX ORDER — FENTANYL CITRATE 50 UG/ML
50 INJECTION, SOLUTION INTRAMUSCULAR; INTRAVENOUS ONCE
Status: DISCONTINUED | OUTPATIENT
Start: 2018-11-30 | End: 2018-11-30

## 2018-11-30 NOTE — ED NOTES
No s/s of distress. Resps easy. A&O. EKG completed. Brusing noted to R david. DSG to R david D/C/I. Pt denies any pain.      Sami Hanna RN  11/30/18 5798

## 2018-11-30 NOTE — ED PROVIDER NOTES
ARTERY BYPASS GRAFT  9/14/15    x3  Dr Frieda Vargas History:  reports that he quit smoking about 33 years ago. His smoking use included Cigarettes. He has a 30.00 pack-year smoking history. He quit smokeless tobacco use about 28 years ago. His smokeless tobacco use included Chew. He reports that he does not drink alcohol or use drugs. Family History: family history includes Heart Attack in his father. Allergies: Patient has no known allergies. Physical Exam           ED Triage Vitals [11/30/18 1132]   BP Temp Temp Source Pulse Resp SpO2 Height Weight   136/70 97.4 °F (36.3 °C) Temporal 74 14 99 % 6' 2\" (1.88 m) 215 lb (97.5 kg)      Oxygen Saturation Interpretation: Normal.    Constitutional:  Alert, development consistent with age. HEENT:  NC/NT. Airway patent. Neck:  Normal ROM. Supple. Respiratory:  Clear to auscultation and breath sounds equal.  CV:  Regular rate and rhythm, normal heart sounds, without pathological murmurs, ectopy, gallops, or rubs. GI:  Abdomen Soft, nontender, good bowel sounds. No firm or pulsatile mass. Back:  No costovertebral tenderness. Extremities: No tenderness or edema noted. Large hematoma noted on right femoral area, pressure noted with palpation. No bruit noted when listening to hematoma. Integument:  Normal turgor. Warm, dry, without visible rash, unless noted elsewhere. Lymphatics: No lymphangitis or adenopathy noted. Neurological:  Oriented. Motor functions intact.      Lab / Imaging Results   (All laboratory and radiology results have been personally reviewed by myself)  Labs:  Results for orders placed or performed during the hospital encounter of 11/30/18   CBC auto differential   Result Value Ref Range    WBC 6.2 4.5 - 11.5 E9/L    RBC 4.35 3.80 - 5.80 E12/L    Hemoglobin 13.6 12.5 - 16.5 g/dL    Hematocrit 41.5 37.0 - 54.0 %    MCV 95.4 80.0 - 99.9 fL    MCH 31.3 26.0 - 35.0 pg    MCHC 32.8 32.0 - 34.5 %    RDW 12.5 11.5 - 15.0 fL

## 2018-12-10 ENCOUNTER — OFFICE VISIT (OUTPATIENT)
Dept: CARDIOLOGY CLINIC | Age: 73
End: 2018-12-10
Payer: MEDICARE

## 2018-12-10 VITALS
BODY MASS INDEX: 28.59 KG/M2 | RESPIRATION RATE: 20 BRPM | SYSTOLIC BLOOD PRESSURE: 124 MMHG | DIASTOLIC BLOOD PRESSURE: 70 MMHG | WEIGHT: 222.8 LBS | HEART RATE: 73 BPM | HEIGHT: 74 IN

## 2018-12-10 DIAGNOSIS — Z95.1 S/P CABG X 3: Chronic | ICD-10-CM

## 2018-12-10 DIAGNOSIS — I25.119 CORONARY ARTERY DISEASE INVOLVING NATIVE HEART WITH ANGINA PECTORIS, UNSPECIFIED VESSEL OR LESION TYPE (HCC): Primary | ICD-10-CM

## 2018-12-10 DIAGNOSIS — I21.4 NSTEMI (NON-ST ELEVATED MYOCARDIAL INFARCTION) (HCC): ICD-10-CM

## 2018-12-10 PROCEDURE — 93000 ELECTROCARDIOGRAM COMPLETE: CPT | Performed by: INTERNAL MEDICINE

## 2018-12-10 PROCEDURE — 99214 OFFICE O/P EST MOD 30 MIN: CPT | Performed by: INTERNAL MEDICINE

## 2018-12-10 NOTE — PROGRESS NOTES
[facility]  Huey Church, MD Jobie Moll, MD Quintella Hodgkins, MD Reda Cork. MD Shanell Bullock   1945  Aaron Fisher DO    Mr. Carmen Jamison was in the outpatient office on 12/10/2018 for follow up of his recent hospitalization. This 80-year-old gentleman has a history of dyslipidemia, diet controlled diabetes, CAD and S/P CABG in . He presented to the hospital on 2018 with shoulder and back pain. Enzyme pattern was consistent with Non-STEMI. Troponin peaked at 5. EKG showed no acute ST-T changes. He underwent cardiac catheterization by Dr. Lemuel Kanner that revealed patent grafts, but thrombotic proximal LAD prior to 100% stenosis, supplying a moderate caliber diagonal.  No PCI was performed. Medical therapy was recommended. Plavix was added to his medication regimen. He was discharged home. He presented to the Emergency Room 2 days later with severe pain in his right groin. Ultrasound revealed a small hematoma, but no pseudoaneurysm. He presented to our office today for a follow up visit. Since being out of the hospital, he notes no more shoulder or back pain. He is able to exert himself without anginal complaints. Denies any chest pain or shortness of breath. He denies orthopnea, PND or lower extremity edema. Past medical history:   1. NKDA. 2. Borderline diabetes. 3. Dyslipidemia. 4. Remote tobacco use. 5. Family history of premature CAD: Father  at age 48 from MI (initial MI at age 52). 6. Hypothyroidism. 7. Crohn's disease with multiple obstructions not necessitating any surgical interventions. 8. Thrombocytopenia in the , status post steroid therapy transiently. 9. Presentation [facility] 09/10/2015 with exertional chest pain consistent with angina. EKG showed nonspecific changes. Troponin peaked at .90, consistent with NSTEMI. 10. Left heart catheterization, right radial approach, 2005. [address] 0%.  LAD mild diffuse proximal disease,mid 90% long lesion, moderate size D1 at the proximal end of the lesion no disease, LCX proximal 90% prior to bifurcation to a large OM, ramus intermedius large proximal 70% stenosis, RCA dominant mild disease in RPLCA, LV 60%, LVEDP 9 mmHg. 11. Echocardiogram 09/11/2015. EF 65%. Mild aortic root dilatation. No significant valvular heart disease. Mild concentric LVH. 12. CABG x3, 09/14/2015. Dr. Eduard Hannah. LIMA-LAD, SVG-ramus intermedius, SVG-OM1. 13. Presentation [facility] 09/30/2015 after a dizzy spell following abruptly standing up from tying his shoes. Presentation /60. Creatinine 1.4. H&H 10.6/32.0. EKG no acute changes. CTA of the chest showed no PE, bibasilar opacities. No cardiac testing performed. 14. Metoprolol discontinued by the patient secondary to severe fatigue. 15. Admission, 11/26/11/28/2018 with a non STEMI. Troponin peaked at 5. EKG, sinus rhythm. Right bundle branch block. 16. Cardiac catheterization, 11/27/2018, Dr. Carolyn Looney. Left main 0%, % stenosis in the mid segment. Proximal LAD, long, thrombotic, severely diseased segment, giving rise to a moderate caliber diagonal, left circumflex, functional occlusion, RCA dominant. 60% stenosis in the RPLCA. LIMA to LAD patent. SVG to ramus patent. SVG to OM patent. EF 55% with anterolateral hypokinesis. EDP 15 mmHg. No PCI performed. Medical therapy advised. 17. Presentation, 11/30/2018, to ED with pain in the right groin.   Ultrasound revealed no evidence of hematoma or pseudoaneurysm.       Review of Systems:  HEENT: negative for acute visual and auditory problems  Constitutional: negative for fever and chills  Respiratory: negative for cough and hemoptysis  Cardiovascular: as per HPI  Gastrointestinal: negative for abdominal pain, diarrhea, nausea and vomiting  Genitourinary: negative for dysuria and hematuria  Derm: negative for rash and skin lesion(s)  Neurological: negative for seizures and

## 2018-12-10 NOTE — PATIENT INSTRUCTIONS
the  may tell you to chew 1 adult-strength or 2 to 4 low-dose aspirin. Wait for an ambulance. Do not try to drive yourself.   Watch closely for changes in your health, and be sure to contact your doctor if you have any problems. Where can you learn more? Go to https://chpepiceweb."[employer] org and sign in to your [employer] account. Enter N834 in the [employer] box to learn more about \"A Healthy Heart: Care Instructions. \"     If you do not have an account, please click on the \"Sign Up Now\" link. Current as of: December 6, 2017  Content Version: 11.8  © 9684-7564 [employer]. Care instructions adapted under license by [facility] ([facility]). If you have questions about a medical condition or this instruction, always ask your healthcare professional. [address] any warranty or liability for your use of this information.

## 2019-03-14 ENCOUNTER — HOSPITAL ENCOUNTER (OUTPATIENT)
Dept: CARDIOLOGY | Age: 74
Discharge: HOME | End: 2019-03-14
Payer: MEDICARE

## 2019-03-14 ENCOUNTER — TELEPHONE (OUTPATIENT)
Dept: CARDIOLOGY CLINIC | Age: 74
End: 2019-03-14

## 2019-03-14 LAB
LEFT VENTRICULAR EJECTION FRACTION HIGH VALUE: 55 %
LV EF: 50 %
LV EF: 53 %
LVEF MODALITY: NORMAL

## 2019-03-14 PROCEDURE — 93306 TTE W/DOPPLER COMPLETE: CPT

## 2019-03-19 ENCOUNTER — OFFICE VISIT (OUTPATIENT)
Dept: CARDIOLOGY CLINIC | Age: 74
End: 2019-03-19
Payer: MEDICARE

## 2019-03-19 VITALS
DIASTOLIC BLOOD PRESSURE: 64 MMHG | HEART RATE: 72 BPM | HEIGHT: 74 IN | WEIGHT: 224 LBS | SYSTOLIC BLOOD PRESSURE: 112 MMHG | RESPIRATION RATE: 18 BRPM | BODY MASS INDEX: 28.75 KG/M2

## 2019-03-19 DIAGNOSIS — Z95.1 S/P CABG X 3: Chronic | ICD-10-CM

## 2019-03-19 DIAGNOSIS — I25.10 CORONARY ARTERY DISEASE WITHOUT ANGINA PECTORIS, UNSPECIFIED VESSEL OR LESION TYPE, UNSPECIFIED WHETHER NATIVE OR TRANSPLANTED HEART: Primary | ICD-10-CM

## 2019-03-19 DIAGNOSIS — I21.4 NON-STEMI (NON-ST ELEVATED MYOCARDIAL INFARCTION) (HCC): ICD-10-CM

## 2019-03-19 PROCEDURE — 93000 ELECTROCARDIOGRAM COMPLETE: CPT | Performed by: NURSE PRACTITIONER

## 2019-03-19 PROCEDURE — 99213 OFFICE O/P EST LOW 20 MIN: CPT | Performed by: NURSE PRACTITIONER

## 2019-06-06 ENCOUNTER — OFFICE VISIT (OUTPATIENT)
Dept: PRIMARY CARE CLINIC | Age: 74
End: 2019-06-06
Payer: MEDICARE

## 2019-06-06 VITALS
TEMPERATURE: 98.1 F | OXYGEN SATURATION: 98 % | DIASTOLIC BLOOD PRESSURE: 76 MMHG | HEART RATE: 84 BPM | HEIGHT: 74 IN | BODY MASS INDEX: 29 KG/M2 | SYSTOLIC BLOOD PRESSURE: 120 MMHG | WEIGHT: 226 LBS

## 2019-06-06 DIAGNOSIS — E03.9 HYPOTHYROIDISM, UNSPECIFIED TYPE: ICD-10-CM

## 2019-06-06 DIAGNOSIS — I25.810 CORONARY ARTERY DISEASE INVOLVING CORONARY BYPASS GRAFT OF NATIVE HEART WITHOUT ANGINA PECTORIS: Primary | ICD-10-CM

## 2019-06-06 DIAGNOSIS — I10 ESSENTIAL HYPERTENSION: ICD-10-CM

## 2019-06-06 DIAGNOSIS — E78.2 MIXED HYPERLIPIDEMIA: ICD-10-CM

## 2019-06-06 DIAGNOSIS — K50.00 CROHN'S DISEASE OF SMALL INTESTINE WITHOUT COMPLICATION (HCC): ICD-10-CM

## 2019-06-06 PROCEDURE — 99214 OFFICE O/P EST MOD 30 MIN: CPT | Performed by: FAMILY MEDICINE

## 2019-06-06 RX ORDER — METOPROLOL SUCCINATE 25 MG/1
25 TABLET, EXTENDED RELEASE ORAL DAILY
Qty: 45 TABLET | Refills: 3 | Status: SHIPPED
Start: 2019-06-06 | End: 2020-08-05

## 2019-06-06 RX ORDER — ROSUVASTATIN CALCIUM 10 MG/1
10 TABLET, COATED ORAL DAILY
Qty: 90 TABLET | Refills: 3 | Status: SHIPPED
Start: 2019-06-06 | End: 2020-07-15

## 2019-06-06 RX ORDER — LEVOTHYROXINE SODIUM 0.05 MG/1
50 TABLET ORAL DAILY
Qty: 90 TABLET | Refills: 3 | Status: SHIPPED
Start: 2019-06-06 | End: 2020-06-01

## 2019-06-06 RX ORDER — ERGOCALCIFEROL 1.25 MG/1
CAPSULE ORAL
COMMUNITY
Start: 2015-11-30 | End: 2019-09-20 | Stop reason: SDUPTHER

## 2019-06-06 ASSESSMENT — ENCOUNTER SYMPTOMS
EYES NEGATIVE: 1
GASTROINTESTINAL NEGATIVE: 1
ALLERGIC/IMMUNOLOGIC NEGATIVE: 1
RESPIRATORY NEGATIVE: 1

## 2019-06-06 ASSESSMENT — PATIENT HEALTH QUESTIONNAIRE - PHQ9
SUM OF ALL RESPONSES TO PHQ QUESTIONS 1-9: 0
SUM OF ALL RESPONSES TO PHQ9 QUESTIONS 1 & 2: 0
SUM OF ALL RESPONSES TO PHQ QUESTIONS 1-9: 0
1. LITTLE INTEREST OR PLEASURE IN DOING THINGS: 0
2. FEELING DOWN, DEPRESSED OR HOPELESS: 0

## 2019-06-06 NOTE — PROGRESS NOTES
19     Daija South    : 1945 Sex: male   Age: 68 y.o. Chief Complaint   Patient presents with    Coronary Artery Disease     saw cardio dr Remedios Keyes & they stopped plavix & cut metoprolol in half    Discuss Labs    Hypothyroidism       Prior to Admission medications    Medication Sig Start Date End Date Taking? Authorizing Provider   aspirin 81 MG tablet Take by mouth 11/30/15  Yes Historical Provider, MD   vitamin D (ERGOCALCIFEROL) 46670 units CAPS capsule Take by mouth 11/30/15  Yes Historical Provider, MD   levothyroxine (SYNTHROID) 50 MCG tablet Take 1 tablet by mouth daily 19  Yes Nichelle Medina DO   rosuvastatin (CRESTOR) 10 MG tablet Take 1 tablet by mouth daily 19  Yes Nichelle Medina DO   metoprolol succinate (TOPROL XL) 25 MG extended release tablet Take 1 tablet by mouth daily 1/2 by mouth daily 19  Yes Cherylene Bergamo, DO          HPI: Anabelle Tobias presents today medical follow-up coronary artery disease hypothyroid hyperlipidemia hypertension Crohn's disease. Stable current medications. History of three-vessel bypass . Recent cardiology evaluation. Plavix discontinued. Toprol all decrease to 12.5 daily          Review of Systems   Constitutional: Negative. HENT: Negative. Eyes: Negative. Respiratory: Negative. Gastrointestinal: Negative. Endocrine: Negative. Genitourinary: Negative. Musculoskeletal: Negative. Skin: Negative. Allergic/Immunologic: Negative. Neurological: Negative. Hematological: Negative. Psychiatric/Behavioral: Negative. present systems review all stable no additional complaints.           Current Outpatient Medications:     aspirin 81 MG tablet, Take by mouth, Disp: , Rfl:     vitamin D (ERGOCALCIFEROL) 42813 units CAPS capsule, Take by mouth, Disp: , Rfl:     levothyroxine (SYNTHROID) 50 MCG tablet, Take 1 tablet by mouth daily, Disp: 90 tablet, Rfl: 3    rosuvastatin (CRESTOR) 10 MG tablet, Take 1 round, and reactive to light. Conjunctivae and EOM are normal.   Neck: Normal range of motion. Neck supple. Cardiovascular: Normal rate and intact distal pulses. Pulmonary/Chest: Breath sounds normal.   Abdominal: Soft. Bowel sounds are normal.   Musculoskeletal: Normal range of motion. Neurological: He is alert and oriented to person, place, and time. Skin: Skin is warm and dry. Psychiatric: He has a normal mood and affect. His behavior is normal.   Nursing note and vitals reviewed. present vitals physical examination were all stable. Heart remains regular lungs are clear    Labs reviewed cholesterol 92 HDL 36 LDL 36 glucose 95 chemistry is all stable  GFR 61 blood count normal.    Maintain current care reassessment in 3 months blood work again at that time. Plan Per Assessment:  Vj Esparza was seen today for coronary artery disease, discuss labs and hypothyroidism. Diagnoses and all orders for this visit:    Coronary artery disease involving coronary bypass graft of native heart without angina pectoris  -     Comprehensive Metabolic Panel; Future  -     Lipid Panel; Future  -     TSH without Reflex; Future    Hypothyroidism, unspecified type  -     TSH without Reflex; Future    Crohn's disease of small intestine without complication ([facility] [address]    Mixed hyperlipidemia  -     Lipid Panel; Future    Essential hypertension  -     Comprehensive Metabolic Panel; Future    Other orders  -     levothyroxine (SYNTHROID) 50 MCG tablet; Take 1 tablet by mouth daily  -     rosuvastatin (CRESTOR) 10 MG tablet; Take 1 tablet by mouth daily  -     metoprolol succinate (TOPROL XL) 25 MG extended release tablet; Take 1 tablet by mouth daily 1/2 by mouth daily            Return in about 3 months (around 9/6/2019). Jennifer Dobbins DO    Note was generated with the assistance of voice recognition software. Document was reviewed however may contain grammatical errors.

## 2019-09-04 ENCOUNTER — HOSPITAL ENCOUNTER (OUTPATIENT)
Age: 74
Discharge: HOME | End: 2019-09-04
Payer: MEDICARE

## 2019-09-04 DIAGNOSIS — I10 ESSENTIAL HYPERTENSION: ICD-10-CM

## 2019-09-04 DIAGNOSIS — E03.9 HYPOTHYROIDISM, UNSPECIFIED TYPE: ICD-10-CM

## 2019-09-04 DIAGNOSIS — E78.2 MIXED HYPERLIPIDEMIA: ICD-10-CM

## 2019-09-04 DIAGNOSIS — I25.810 CORONARY ARTERY DISEASE INVOLVING CORONARY BYPASS GRAFT OF NATIVE HEART WITHOUT ANGINA PECTORIS: ICD-10-CM

## 2019-09-04 LAB
ALBUMIN SERPL-MCNC: 3.8 G/DL (ref 3.5–5.2)
ALP BLD-CCNC: 71 U/L (ref 40–129)
ALT SERPL-CCNC: 13 U/L (ref 0–40)
ANION GAP SERPL CALCULATED.3IONS-SCNC: 11 MMOL/L (ref 7–16)
AST SERPL-CCNC: 20 U/L (ref 0–39)
BILIRUB SERPL-MCNC: 0.3 MG/DL (ref 0–1.2)
BUN BLDV-MCNC: 14 MG/DL (ref 8–23)
CALCIUM SERPL-MCNC: 9.1 MG/DL (ref 8.6–10.2)
CHLORIDE BLD-SCNC: 106 MMOL/L (ref 98–107)
CHOLESTEROL, TOTAL: 110 MG/DL (ref 0–199)
CO2: 23 MMOL/L (ref 22–29)
CREAT SERPL-MCNC: 1.2 MG/DL (ref 0.7–1.2)
GFR AFRICAN AMERICAN: >60
GFR NON-AFRICAN AMERICAN: 59 ML/MIN/1.73
GLUCOSE BLD-MCNC: 105 MG/DL (ref 74–99)
HDLC SERPL-MCNC: 34 MG/DL
LDL CHOLESTEROL CALCULATED: 47 MG/DL (ref 0–99)
POTASSIUM SERPL-SCNC: 4.1 MMOL/L (ref 3.5–5)
SODIUM BLD-SCNC: 140 MMOL/L (ref 132–146)
TOTAL PROTEIN: 6.6 G/DL (ref 6.4–8.3)
TRIGL SERPL-MCNC: 143 MG/DL (ref 0–149)
TSH SERPL DL<=0.05 MIU/L-ACNC: 2.31 UIU/ML (ref 0.27–4.2)
VLDLC SERPL CALC-MCNC: 29 MG/DL

## 2019-09-04 PROCEDURE — 84443 ASSAY THYROID STIM HORMONE: CPT

## 2019-09-04 PROCEDURE — 36415 COLL VENOUS BLD VENIPUNCTURE: CPT

## 2019-09-04 PROCEDURE — 80053 COMPREHEN METABOLIC PANEL: CPT

## 2019-09-04 PROCEDURE — 80061 LIPID PANEL: CPT

## 2019-09-10 ENCOUNTER — OFFICE VISIT (OUTPATIENT)
Dept: PRIMARY CARE CLINIC | Age: 74
End: 2019-09-10
Payer: MEDICARE

## 2019-09-10 VITALS
HEART RATE: 64 BPM | DIASTOLIC BLOOD PRESSURE: 72 MMHG | WEIGHT: 226 LBS | BODY MASS INDEX: 29.02 KG/M2 | SYSTOLIC BLOOD PRESSURE: 138 MMHG | OXYGEN SATURATION: 98 % | TEMPERATURE: 97.5 F

## 2019-09-10 DIAGNOSIS — K50.00 CROHN'S DISEASE OF SMALL INTESTINE WITHOUT COMPLICATION (HCC): Chronic | ICD-10-CM

## 2019-09-10 DIAGNOSIS — E03.9 HYPOTHYROIDISM, UNSPECIFIED TYPE: Chronic | ICD-10-CM

## 2019-09-10 DIAGNOSIS — E78.5 HYPERLIPIDEMIA, UNSPECIFIED HYPERLIPIDEMIA TYPE: ICD-10-CM

## 2019-09-10 DIAGNOSIS — I25.10 CORONARY ARTERY DISEASE INVOLVING NATIVE CORONARY ARTERY OF NATIVE HEART WITHOUT ANGINA PECTORIS: Primary | Chronic | ICD-10-CM

## 2019-09-10 PROCEDURE — 99214 OFFICE O/P EST MOD 30 MIN: CPT | Performed by: FAMILY MEDICINE

## 2019-09-10 ASSESSMENT — ENCOUNTER SYMPTOMS
ALLERGIC/IMMUNOLOGIC NEGATIVE: 1
EYES NEGATIVE: 1
RESPIRATORY NEGATIVE: 1
GASTROINTESTINAL NEGATIVE: 1

## 2019-09-10 NOTE — PROGRESS NOTES
9/10/19     Mariel Reynolds    : 1945 Sex: male   Age: 68 y.o. Chief Complaint   Patient presents with    Discuss Labs    Coronary Artery Disease       Prior to Admission medications    Medication Sig Start Date End Date Taking? Authorizing Provider   aspirin 81 MG tablet Take by mouth 11/30/15  Yes Historical Provider, MD   vitamin D (ERGOCALCIFEROL) 83839 units CAPS capsule Take by mouth 11/30/15  Yes Historical Provider, MD   levothyroxine (SYNTHROID) 50 MCG tablet Take 1 tablet by mouth daily 19  Yes Dean Medina DO   rosuvastatin (CRESTOR) 10 MG tablet Take 1 tablet by mouth daily 19  Yes eDan Medina DO   metoprolol succinate (TOPROL XL) 25 MG extended release tablet Take 1 tablet by mouth daily 1/2 by mouth daily 19  Yes Dale Preston DO          HPI: 3 VESSELS . Patient is seen today medical follow-up on coronary artery disease Crohn's hypothyroid hyperlipidemia. Stable with current medications. Review of Systems   Constitutional: Negative. HENT: Negative. Eyes: Negative. Respiratory: Negative. Gastrointestinal: Negative. Endocrine: Negative. Genitourinary: Negative. Musculoskeletal: Negative. Skin: Negative. Allergic/Immunologic: Negative. Neurological: Negative. Hematological: Negative. Psychiatric/Behavioral: Negative. Systems review all stable as noted. Coronary artery disease well-controlled. History of three-vessel bypass in  has done well.         Current Outpatient Medications:     aspirin 81 MG tablet, Take by mouth, Disp: , Rfl:     vitamin D (ERGOCALCIFEROL) 28928 units CAPS capsule, Take by mouth, Disp: , Rfl:     levothyroxine (SYNTHROID) 50 MCG tablet, Take 1 tablet by mouth daily, Disp: 90 tablet, Rfl: 3    rosuvastatin (CRESTOR) 10 MG tablet, Take 1 tablet by mouth daily, Disp: 90 tablet, Rfl: 3    metoprolol succinate (TOPROL XL) 25 MG extended release tablet, Take 1 tablet by mouth Pulmonary/Chest: Breath sounds normal.   Abdominal: Soft. Bowel sounds are normal.   Musculoskeletal: Normal range of motion. Neurological: He is alert and oriented to person, place, and time. Skin: Skin is warm and dry. Psychiatric: He has a normal mood and affect. His behavior is normal.   Nursing note and vitals reviewed. Present physical exam is all stable as noted. Medications will continue as prescribed. Labs reviewed and stable.   Reassessment 3 months blood work at that time physical.  Lab Results   Component Value Date    TSH 2.310 09/04/2019    TSH 1.770 09/10/2015    P8BRYSQ 6.1 08/03/2011    FT3 2.4 08/03/2011     Lab Results   Component Value Date    CHOL 110 09/04/2019    CHOL 112 11/27/2018     Lab Results   Component Value Date    TRIG 143 09/04/2019    TRIG 108 11/27/2018     Lab Results   Component Value Date    HDL 34 09/04/2019    HDL 35 11/27/2018     No results found for: [facility]  Lab Results   Component Value Date    LABVLDL 29 09/04/2019    LABVLDL 22 11/27/2018     No results found for: [facility]  Lab Results   Component Value Date    WBC 6.2 11/30/2018    HGB 13.6 11/30/2018    HCT 41.5 11/30/2018    MCV 95.4 11/30/2018     11/30/2018    LYMPHOPCT 18.6 (L) 11/30/2018    RBC 4.35 11/30/2018    MCH 31.3 11/30/2018    MCHC 32.8 11/30/2018    RDW 12.5 11/30/2018     Lab Results   Component Value Date     09/04/2019    K 4.1 09/04/2019     09/04/2019    CO2 23 09/04/2019    BUN 14 09/04/2019    CREATININE 1.2 09/04/2019    GLUCOSE 105 (H) 09/04/2019    CALCIUM 9.1 09/04/2019    PROT 6.6 09/04/2019    LABALBU 3.8 09/04/2019    BILITOT 0.3 09/04/2019    ALKPHOS 71 09/04/2019    AST 20 09/04/2019    ALT 13 09/04/2019    LABGLOM 59 09/04/2019    GFRAA >60 09/04/2019        Lab Results   Component Value Date    PSA 0.50 09/15/2014      Lab Results   Component Value Date    LABA1C 5.5 09/13/2015     No results found for: EAG           Plan Per

## 2019-09-20 RX ORDER — ERGOCALCIFEROL 1.25 MG/1
50000 CAPSULE ORAL WEEKLY
Qty: 5 CAPSULE | Refills: 3 | Status: SHIPPED
Start: 2019-09-20 | End: 2020-03-12 | Stop reason: SDUPTHER

## 2019-10-29 ENCOUNTER — OFFICE VISIT (OUTPATIENT)
Dept: CARDIOLOGY CLINIC | Age: 74
End: 2019-10-29
Payer: MEDICARE

## 2019-10-29 VITALS
BODY MASS INDEX: 27.75 KG/M2 | HEART RATE: 55 BPM | DIASTOLIC BLOOD PRESSURE: 60 MMHG | RESPIRATION RATE: 16 BRPM | OXYGEN SATURATION: 97 % | HEIGHT: 74 IN | WEIGHT: 216.2 LBS | SYSTOLIC BLOOD PRESSURE: 130 MMHG

## 2019-10-29 DIAGNOSIS — I25.10 CORONARY ARTERY DISEASE INVOLVING NATIVE CORONARY ARTERY OF NATIVE HEART WITHOUT ANGINA PECTORIS: Primary | Chronic | ICD-10-CM

## 2019-10-29 PROCEDURE — 99213 OFFICE O/P EST LOW 20 MIN: CPT | Performed by: INTERNAL MEDICINE

## 2019-10-29 PROCEDURE — 93000 ELECTROCARDIOGRAM COMPLETE: CPT | Performed by: INTERNAL MEDICINE

## 2019-10-29 ASSESSMENT — ENCOUNTER SYMPTOMS
VOMITING: 0
WHEEZING: 0
COUGH: 0
BACK PAIN: 0
SHORTNESS OF BREATH: 0
NAUSEA: 0
ABDOMINAL PAIN: 0
DIARRHEA: 0
CONSTIPATION: 0
BLOOD IN STOOL: 0

## 2019-11-27 ENCOUNTER — TELEPHONE (OUTPATIENT)
Dept: ADMINISTRATIVE | Age: 74
End: 2019-11-27

## 2019-12-04 LAB
ALBUMIN SERPL-MCNC: 3.9 G/DL
ALP BLD-CCNC: 71 U/L
ALT SERPL-CCNC: 14 U/L
ANION GAP SERPL CALCULATED.3IONS-SCNC: NORMAL MMOL/L
AST SERPL-CCNC: 19 U/L
BASOPHILS ABSOLUTE: NORMAL
BASOPHILS RELATIVE PERCENT: NORMAL
BILIRUB SERPL-MCNC: 0.6 MG/DL (ref 0.1–1.4)
BUN BLDV-MCNC: 12 MG/DL
CALCIUM SERPL-MCNC: 8.9 MG/DL
CHLORIDE BLD-SCNC: 108 MMOL/L
CHOLESTEROL, TOTAL: 122 MG/DL
CHOLESTEROL/HDL RATIO: 3.1
CO2: 27 MMOL/L
CREAT SERPL-MCNC: 1.08 MG/DL
EOSINOPHILS ABSOLUTE: NORMAL
EOSINOPHILS RELATIVE PERCENT: NORMAL
GFR CALCULATED: NORMAL
GLUCOSE BLD-MCNC: 96 MG/DL
HCT VFR BLD CALC: 43.7 % (ref 41–53)
HDLC SERPL-MCNC: 36 MG/DL (ref 35–70)
HEMOGLOBIN: 14.3 G/DL (ref 13.5–17.5)
LDL CHOLESTEROL CALCULATED: 53 MG/DL (ref 0–160)
LYMPHOCYTES ABSOLUTE: NORMAL
LYMPHOCYTES RELATIVE PERCENT: NORMAL
MCH RBC QN AUTO: NORMAL PG
MCHC RBC AUTO-ENTMCNC: NORMAL G/DL
MCV RBC AUTO: NORMAL FL
MONOCYTES ABSOLUTE: NORMAL
MONOCYTES RELATIVE PERCENT: NORMAL
NEUTROPHILS ABSOLUTE: NORMAL
NEUTROPHILS RELATIVE PERCENT: NORMAL
PDW BLD-RTO: NORMAL %
PLATELET # BLD: NORMAL 10*3/UL
PMV BLD AUTO: NORMAL FL
POTASSIUM SERPL-SCNC: 4.2 MMOL/L
PROSTATE SPECIFIC ANTIGEN: 0.3 NG/ML
RBC # BLD: 4.51 10^6/ΜL
SODIUM BLD-SCNC: 141 MMOL/L
T4 TOTAL: 4.9
TOTAL PROTEIN: 6.3
TRIGL SERPL-MCNC: 155 MG/DL
TSH SERPL DL<=0.05 MIU/L-ACNC: 3.12 UIU/ML
VLDLC SERPL CALC-MCNC: NORMAL MG/DL
WBC # BLD: 5.1 10^3/ML

## 2019-12-07 DIAGNOSIS — E03.9 HYPOTHYROIDISM, UNSPECIFIED TYPE: Chronic | ICD-10-CM

## 2019-12-07 DIAGNOSIS — E78.5 HYPERLIPIDEMIA, UNSPECIFIED HYPERLIPIDEMIA TYPE: ICD-10-CM

## 2019-12-07 DIAGNOSIS — I25.10 CORONARY ARTERY DISEASE INVOLVING NATIVE CORONARY ARTERY OF NATIVE HEART WITHOUT ANGINA PECTORIS: Chronic | ICD-10-CM

## 2019-12-07 DIAGNOSIS — K50.00 CROHN'S DISEASE OF SMALL INTESTINE WITHOUT COMPLICATION (HCC): Chronic | ICD-10-CM

## 2019-12-11 ENCOUNTER — OFFICE VISIT (OUTPATIENT)
Dept: PRIMARY CARE CLINIC | Age: 74
End: 2019-12-11
Payer: MEDICARE

## 2019-12-11 VITALS
OXYGEN SATURATION: 98 % | SYSTOLIC BLOOD PRESSURE: 134 MMHG | HEART RATE: 67 BPM | TEMPERATURE: 98.2 F | BODY MASS INDEX: 28.25 KG/M2 | DIASTOLIC BLOOD PRESSURE: 78 MMHG | WEIGHT: 220 LBS

## 2019-12-11 DIAGNOSIS — E78.5 HYPERLIPIDEMIA, UNSPECIFIED HYPERLIPIDEMIA TYPE: Chronic | ICD-10-CM

## 2019-12-11 DIAGNOSIS — E03.9 HYPOTHYROIDISM, UNSPECIFIED TYPE: ICD-10-CM

## 2019-12-11 DIAGNOSIS — Z95.1 S/P CABG X 3: Chronic | ICD-10-CM

## 2019-12-11 DIAGNOSIS — I10 ESSENTIAL HYPERTENSION: ICD-10-CM

## 2019-12-11 DIAGNOSIS — K50.00 CROHN'S DISEASE OF SMALL INTESTINE WITHOUT COMPLICATION (HCC): Chronic | ICD-10-CM

## 2019-12-11 DIAGNOSIS — I25.10 CORONARY ARTERY DISEASE INVOLVING NATIVE CORONARY ARTERY OF NATIVE HEART WITHOUT ANGINA PECTORIS: Primary | Chronic | ICD-10-CM

## 2019-12-11 PROCEDURE — 99214 OFFICE O/P EST MOD 30 MIN: CPT | Performed by: FAMILY MEDICINE

## 2019-12-11 RX ORDER — CLOPIDOGREL BISULFATE 75 MG/1
TABLET ORAL
COMMUNITY
Start: 2019-12-03 | End: 2020-03-02 | Stop reason: SDUPTHER

## 2019-12-11 ASSESSMENT — ENCOUNTER SYMPTOMS
EYES NEGATIVE: 1
GASTROINTESTINAL NEGATIVE: 1
RESPIRATORY NEGATIVE: 1
ALLERGIC/IMMUNOLOGIC NEGATIVE: 1

## 2020-03-02 RX ORDER — CLOPIDOGREL BISULFATE 75 MG/1
TABLET ORAL
Qty: 90 TABLET | Refills: 4 | Status: SHIPPED
Start: 2020-03-02 | End: 2020-03-12

## 2020-03-05 LAB
ALBUMIN SERPL-MCNC: 3.8 G/DL
ALP BLD-CCNC: 67 U/L
ALT SERPL-CCNC: 13 U/L
ANION GAP SERPL CALCULATED.3IONS-SCNC: NORMAL MMOL/L
AST SERPL-CCNC: 19 U/L
BILIRUB SERPL-MCNC: 0.6 MG/DL (ref 0.1–1.4)
BUN BLDV-MCNC: 14 MG/DL
CALCIUM SERPL-MCNC: 9 MG/DL
CHLORIDE BLD-SCNC: 108 MMOL/L
CHOLESTEROL, TOTAL: 95 MG/DL
CHOLESTEROL/HDL RATIO: 3.2
CO2: 28 MMOL/L
CREAT SERPL-MCNC: 1.16 MG/DL
GFR CALCULATED: 62
GLUCOSE BLD-MCNC: 97 MG/DL
HDLC SERPL-MCNC: 30 MG/DL (ref 35–70)
LDL CHOLESTEROL CALCULATED: 40 MG/DL (ref 0–160)
POTASSIUM SERPL-SCNC: 4.5 MMOL/L
SODIUM BLD-SCNC: 142 MMOL/L
TOTAL PROTEIN: 6.2
TRIGL SERPL-MCNC: 167 MG/DL
VLDLC SERPL CALC-MCNC: ABNORMAL MG/DL

## 2020-03-12 ENCOUNTER — OFFICE VISIT (OUTPATIENT)
Dept: PRIMARY CARE CLINIC | Age: 75
End: 2020-03-12
Payer: MEDICARE

## 2020-03-12 VITALS
HEART RATE: 68 BPM | BODY MASS INDEX: 29.15 KG/M2 | SYSTOLIC BLOOD PRESSURE: 140 MMHG | DIASTOLIC BLOOD PRESSURE: 74 MMHG | WEIGHT: 227 LBS | TEMPERATURE: 97.8 F | OXYGEN SATURATION: 97 %

## 2020-03-12 PROCEDURE — 99214 OFFICE O/P EST MOD 30 MIN: CPT | Performed by: FAMILY MEDICINE

## 2020-03-12 RX ORDER — ERGOCALCIFEROL 1.25 MG/1
50000 CAPSULE ORAL WEEKLY
Qty: 4 CAPSULE | Refills: 3 | Status: SHIPPED
Start: 2020-03-12 | End: 2020-05-27

## 2020-03-12 ASSESSMENT — ENCOUNTER SYMPTOMS
EYES NEGATIVE: 1
ALLERGIC/IMMUNOLOGIC NEGATIVE: 1
GASTROINTESTINAL NEGATIVE: 1
RESPIRATORY NEGATIVE: 1

## 2020-03-12 ASSESSMENT — PATIENT HEALTH QUESTIONNAIRE - PHQ9
SUM OF ALL RESPONSES TO PHQ QUESTIONS 1-9: 0
SUM OF ALL RESPONSES TO PHQ QUESTIONS 1-9: 0
1. LITTLE INTEREST OR PLEASURE IN DOING THINGS: 0
2. FEELING DOWN, DEPRESSED OR HOPELESS: 0
SUM OF ALL RESPONSES TO PHQ9 QUESTIONS 1 & 2: 0

## 2020-05-27 RX ORDER — ERGOCALCIFEROL 1.25 MG/1
CAPSULE ORAL
Qty: 12 CAPSULE | Refills: 1 | Status: SHIPPED
Start: 2020-05-27 | End: 2020-11-16

## 2020-06-01 RX ORDER — LEVOTHYROXINE SODIUM 0.05 MG/1
TABLET ORAL
Qty: 90 TABLET | Refills: 3 | Status: SHIPPED
Start: 2020-06-01 | End: 2021-03-17

## 2020-06-12 LAB
ALBUMIN SERPL-MCNC: NORMAL G/DL
ALP BLD-CCNC: NORMAL U/L
ALT SERPL-CCNC: NORMAL U/L
ANION GAP SERPL CALCULATED.3IONS-SCNC: NORMAL MMOL/L
AST SERPL-CCNC: NORMAL U/L
BASOPHILS ABSOLUTE: NORMAL
BASOPHILS RELATIVE PERCENT: NORMAL
BILIRUB SERPL-MCNC: NORMAL MG/DL
BUN BLDV-MCNC: 17 MG/DL
CALCIUM SERPL-MCNC: NORMAL MG/DL
CHLORIDE BLD-SCNC: NORMAL MMOL/L
CHOLESTEROL, TOTAL: 96 MG/DL
CHOLESTEROL/HDL RATIO: 2.8
CO2: NORMAL
CREAT SERPL-MCNC: 1.15 MG/DL
EOSINOPHILS ABSOLUTE: NORMAL
EOSINOPHILS RELATIVE PERCENT: NORMAL
GFR CALCULATED: NORMAL
GLUCOSE BLD-MCNC: 96 MG/DL
HCT VFR BLD CALC: NORMAL %
HDLC SERPL-MCNC: 34 MG/DL (ref 35–70)
HEMOGLOBIN: NORMAL
LDL CHOLESTEROL CALCULATED: 39 MG/DL (ref 0–160)
LYMPHOCYTES ABSOLUTE: NORMAL
LYMPHOCYTES RELATIVE PERCENT: NORMAL
MCH RBC QN AUTO: NORMAL PG
MCHC RBC AUTO-ENTMCNC: NORMAL G/DL
MCV RBC AUTO: NORMAL FL
MONOCYTES ABSOLUTE: NORMAL
MONOCYTES RELATIVE PERCENT: NORMAL
NEUTROPHILS ABSOLUTE: NORMAL
NEUTROPHILS RELATIVE PERCENT: NORMAL
PDW BLD-RTO: NORMAL %
PLATELET # BLD: NORMAL 10*3/UL
PMV BLD AUTO: NORMAL FL
POTASSIUM SERPL-SCNC: 4.5 MMOL/L
RBC # BLD: NORMAL 10*6/UL
SODIUM BLD-SCNC: NORMAL MMOL/L
T4 TOTAL: 5.1
TOTAL CK: 137 U/L
TOTAL PROTEIN: NORMAL
TRIGL SERPL-MCNC: 147 MG/DL
TSH SERPL DL<=0.05 MIU/L-ACNC: 2.34 UIU/ML
VLDLC SERPL CALC-MCNC: ABNORMAL MG/DL
WBC # BLD: NORMAL 10*3/UL

## 2020-06-15 ENCOUNTER — OFFICE VISIT (OUTPATIENT)
Dept: PRIMARY CARE CLINIC | Age: 75
End: 2020-06-15
Payer: MEDICARE

## 2020-06-15 VITALS
WEIGHT: 228 LBS | OXYGEN SATURATION: 98 % | BODY MASS INDEX: 29.27 KG/M2 | DIASTOLIC BLOOD PRESSURE: 72 MMHG | SYSTOLIC BLOOD PRESSURE: 140 MMHG | HEART RATE: 66 BPM

## 2020-06-15 PROCEDURE — 99214 OFFICE O/P EST MOD 30 MIN: CPT | Performed by: FAMILY MEDICINE

## 2020-07-15 RX ORDER — ROSUVASTATIN CALCIUM 10 MG/1
TABLET, COATED ORAL
Qty: 90 TABLET | Refills: 3 | Status: SHIPPED
Start: 2020-07-15 | End: 2021-07-12

## 2020-08-05 RX ORDER — METOPROLOL SUCCINATE 25 MG/1
TABLET, EXTENDED RELEASE ORAL
Qty: 45 TABLET | Refills: 3 | Status: SHIPPED
Start: 2020-08-05 | End: 2021-08-02

## 2020-09-15 ENCOUNTER — OFFICE VISIT (OUTPATIENT)
Dept: PRIMARY CARE CLINIC | Age: 75
End: 2020-09-15
Payer: MEDICARE

## 2020-09-15 VITALS
OXYGEN SATURATION: 98 % | TEMPERATURE: 97.1 F | WEIGHT: 225 LBS | HEART RATE: 62 BPM | DIASTOLIC BLOOD PRESSURE: 70 MMHG | BODY MASS INDEX: 28.89 KG/M2 | SYSTOLIC BLOOD PRESSURE: 138 MMHG

## 2020-09-15 PROBLEM — I25.119 CORONARY ARTERY DISEASE INVOLVING NATIVE HEART WITH ANGINA PECTORIS (HCC): Status: ACTIVE | Noted: 2018-11-26

## 2020-09-15 PROCEDURE — 99214 OFFICE O/P EST MOD 30 MIN: CPT | Performed by: FAMILY MEDICINE

## 2020-09-15 ASSESSMENT — ENCOUNTER SYMPTOMS
RESPIRATORY NEGATIVE: 1
EYES NEGATIVE: 1
ALLERGIC/IMMUNOLOGIC NEGATIVE: 1
GASTROINTESTINAL NEGATIVE: 1

## 2020-09-15 NOTE — PROGRESS NOTES
9/15/20     Ashley He    : 1945 Sex: male   Age: 69 y.o. Chief Complaint   Patient presents with    Coronary Artery Disease    Hyperlipidemia    Hypertension    Fatigue     feels like he doesn't have great energy lately       Prior to Admission medications    Medication Sig Start Date End Date Taking? Authorizing Provider   metoprolol succinate (TOPROL XL) 25 MG extended release tablet TAKE ONE-HALF (1/2) TABLET DAILY 20  Yes Chapincito Medina DO   rosuvastatin (CRESTOR) 10 MG tablet TAKE 1 TABLET DAILY 7/15/20  Yes Chapincito Medina, DO   levothyroxine (SYNTHROID) 50 MCG tablet TAKE 1 TABLET DAILY 20  Yes Chapincito Medina DO   vitamin D (ERGOCALCIFEROL) 1.25 MG (36867 UT) CAPS capsule TAKE 1 CAPSULE BY MOUTH ONE TIME PER WEEK 20  Yes Chapincito Medina DO   aspirin 81 MG tablet Take by mouth 11/30/15  Yes Historical Provider, MD          HPI: Kyle Chand seen today medical follow-up of coronary artery disease hypertension hypothyroid CABG x3 hyperlipidemia all of which have been stable. Primary complaint is intermittent fatigue but overall he is managing well still biking on a regular basis recently a 50 mile trip and did well. Review of Systems   Constitutional: Negative. HENT: Negative. Eyes: Negative. Respiratory: Negative. Gastrointestinal: Negative. Endocrine: Negative. Genitourinary: Negative. Musculoskeletal: Negative. Skin: Negative. Allergic/Immunologic: Negative. Neurological: Negative. Hematological: Negative. Psychiatric/Behavioral: Negative. Current systems review is overall stable no additional complaints.           Current Outpatient Medications:     metoprolol succinate (TOPROL XL) 25 MG extended release tablet, TAKE ONE-HALF (1/2) TABLET DAILY, Disp: 45 tablet, Rfl: 3    rosuvastatin (CRESTOR) 10 MG tablet, TAKE 1 TABLET DAILY, Disp: 90 tablet, Rfl: 3    levothyroxine (SYNTHROID) 50 MCG tablet, TAKE 1 TABLET DAILY, Disp: 90 tablet, Rfl: 3    vitamin D (ERGOCALCIFEROL) 1.25 MG (24887 UT) CAPS capsule, TAKE 1 CAPSULE BY MOUTH ONE TIME PER WEEK, Disp: 12 capsule, Rfl: 1    aspirin 81 MG tablet, Take by mouth, Disp: , Rfl:     No Known Allergies    Social History     Tobacco Use    Smoking status: Former Smoker     Packs/day: 2.00     Years: 15.00     Pack years: 30.00     Types: Cigarettes     Last attempt to quit: 1985     Years since quittin.5    Smokeless tobacco: Former User     Types: Chew     Quit date: 1990   Substance Use Topics    Alcohol use: No     Alcohol/week: 0.0 standard drinks     Comment: Rarely    Drug use: No      Past Surgical History:   Procedure Laterality Date    CARDIAC CATHETERIZATION  9/11/15    Dr Christopher Leon, multi vessel disease, CTS consult, Dr. Martell Silence  2018    Dr. Puentes Pipe- Bypass grafts patent   [address]  2018    CHOLECYSTECTOMY      COLONOSCOPY  10/23/2017    COLONOSCOPY  10/23/2017    CORONARY ARTERY BYPASS GRAFT  9/14/15    x3  Dr Jovan Card History   Problem Relation Age of Onset    Other Mother     Heart Attack Father     Heart Disease Father      Past Medical History:   Diagnosis Date    Atrial fibrillation ([facility] [address]     CAD (coronary artery disease)     Crohn's disease ([facility] [address]     Crohn's ([facility])    Essential hypertension 2019    Hyperlipidemia 2012    Hypothyroid     Iron deficiency anemia     Kidney stone     Platelets decreased (HCC)     low platelets during the ?  S/P CABG x 3 3/7/2017       Vitals:    09/15/20 1019   BP: 138/70   Pulse: 62   Temp: 97.1 °F (36.2 °C)   SpO2: 98%   Weight: 225 lb (102.1 kg)     BP Readings from Last 3 Encounters:   09/15/20 138/70   06/15/20 (!) 140/72   20 (!) 140/74        Physical Exam  Vitals signs and nursing note reviewed. Constitutional:       Appearance: He is well-developed.    HENT:      Head: Normocephalic. Right Ear: External ear normal.      Left Ear: External ear normal.      Nose: Nose normal.   Eyes:      Conjunctiva/sclera: Conjunctivae normal.      Pupils: Pupils are equal, round, and reactive to light. Neck:      Musculoskeletal: Normal range of motion and neck supple. Cardiovascular:      Rate and Rhythm: Normal rate. Pulmonary:      Breath sounds: Normal breath sounds. Abdominal:      General: Bowel sounds are normal.      Palpations: Abdomen is soft. Musculoskeletal: Normal range of motion. Skin:     General: Skin is warm and dry. Neurological:      Mental Status: He is alert and oriented to person, place, and time. Psychiatric:         Behavior: Behavior normal.     Today's vitals physical examination stable. We will maintain present medications and care. Reassessment 3 months and blood work at that time. Plan Per Assessment:  Diego Hadley was seen today for coronary artery disease, hyperlipidemia, hypertension and fatigue. Diagnoses and all orders for this visit:    Coronary artery disease involving native coronary artery of native heart without angina pectoris  -     CBC Auto Differential; Future  -     Comprehensive Metabolic Panel; Future  -     Lipid Panel; Future  -     T4; Future  -     TSH without Reflex; Future    Essential hypertension  -     CBC Auto Differential; Future  -     Comprehensive Metabolic Panel; Future  -     Lipid Panel; Future  -     T4; Future  -     TSH without Reflex; Future    Hypothyroidism, unspecified type  -     CBC Auto Differential; Future  -     Comprehensive Metabolic Panel; Future  -     Lipid Panel; Future  -     T4; Future  -     TSH without Reflex; Future    S/P CABG x 3    Hyperlipidemia, unspecified hyperlipidemia type  -     CBC Auto Differential; Future  -     Comprehensive Metabolic Panel; Future  -     Lipid Panel; Future  -     T4; Future  -     TSH without Reflex;  Future            Return in about 3 months (around 12/15/2020). Swati Guevara DO    Note was generated with the assistance of voice recognition software. Document was reviewed however may contain grammatical errors.

## 2020-10-12 ENCOUNTER — OFFICE VISIT (OUTPATIENT)
Dept: CARDIOLOGY CLINIC | Age: 75
End: 2020-10-12
Payer: MEDICARE

## 2020-10-12 VITALS
BODY MASS INDEX: 28.85 KG/M2 | DIASTOLIC BLOOD PRESSURE: 64 MMHG | HEART RATE: 68 BPM | HEIGHT: 74 IN | WEIGHT: 224.8 LBS | RESPIRATION RATE: 18 BRPM | SYSTOLIC BLOOD PRESSURE: 126 MMHG

## 2020-10-12 PROCEDURE — 93000 ELECTROCARDIOGRAM COMPLETE: CPT | Performed by: INTERNAL MEDICINE

## 2020-10-12 PROCEDURE — 99213 OFFICE O/P EST LOW 20 MIN: CPT | Performed by: INTERNAL MEDICINE

## 2020-10-12 ASSESSMENT — ENCOUNTER SYMPTOMS
NAUSEA: 0
SHORTNESS OF BREATH: 0
COUGH: 0
WHEEZING: 0
BLOOD IN STOOL: 0
BACK PAIN: 0
ABDOMINAL PAIN: 0
CONSTIPATION: 0
VOMITING: 0
DIARRHEA: 0

## 2020-10-12 NOTE — PROGRESS NOTES
Past Medical History:  Past Medical History:   Diagnosis Date    Atrial fibrillation ([facility] [address]     CAD (coronary artery disease)     Crohn's disease ([facility] [address]     Crohn's ([facility])    Essential hypertension 2019    Hyperlipidemia 2012    Hypothyroid     Iron deficiency anemia     Kidney stone     Platelets decreased (HCC)     low platelets during the ?     S/P CABG x 3 3/7/2017       Past Surgical History:  Past Surgical History:   Procedure Laterality Date    CARDIAC CATHETERIZATION  9/11/15    Dr Stella Valles, multi vessel disease, CTS consult, Dr. Melinda Fletcher  2018    Dr. Franchesca Pavon- Bypass grafts patent   [address]  2018    CHOLECYSTECTOMY      COLONOSCOPY  10/23/2017    COLONOSCOPY  10/23/2017    CORONARY ARTERY BYPASS GRAFT  9/14/15    x3  Dr Jesus Acuña         Family History:  Family History   Problem Relation Age of Onset    Other Mother     Heart Attack Father     Heart Disease Father        Social History:  Social History     Socioeconomic History    Marital status:      Spouse name: Not on file    Number of children: Not on file    Years of education: Not on file    Highest education level: Not on file   Occupational History    Not on file   Social Needs    Financial resource strain: Not on file    Food insecurity     Worry: Not on file     Inability: Not on file    Transportation needs     Medical: Not on file     Non-medical: Not on file   Tobacco Use    Smoking status: Former Smoker     Packs/day: 2.00     Years: 15.00     Pack years: 30.00     Types: Cigarettes     Last attempt to quit: 1985     Years since quittin.6    Smokeless tobacco: Former User     Types: Chew     Quit date: 1990   Substance and Sexual Activity    Alcohol use: No     Alcohol/week: 0.0 standard drinks     Comment: Rarely    Drug use: No    Sexual activity: Not on file   Lifestyle    Physical activity     Days per week: Not on file     Minutes per session: Not on file    Stress: Not on file   Relationships    Social connections     Talks on phone: Not on file     Gets together: Not on file     Attends Jewish service: Not on file     Active member of club or organization: Not on file     Attends meetings of clubs or organizations: Not on file     Relationship status: Not on file    Intimate partner violence     Fear of current or ex partner: Not on file     Emotionally abused: Not on file     Physically abused: Not on file     Forced sexual activity: Not on file   Other Topics Concern    Not on file   Social History Narrative    Patient states he drinks 3 -4 cups of coffee daily. Allergies:  No Known Allergies    Current Medications:  Current Outpatient Medications   Medication Sig Dispense Refill    metoprolol succinate (TOPROL XL) 25 MG extended release tablet TAKE ONE-HALF (1/2) TABLET DAILY 45 tablet 3    rosuvastatin (CRESTOR) 10 MG tablet TAKE 1 TABLET DAILY 90 tablet 3    levothyroxine (SYNTHROID) 50 MCG tablet TAKE 1 TABLET DAILY 90 tablet 3    vitamin D (ERGOCALCIFEROL) 1.25 MG (17783 UT) CAPS capsule TAKE 1 CAPSULE BY MOUTH ONE TIME PER WEEK 12 capsule 1    aspirin 81 MG tablet Take by mouth       No current facility-administered medications for this visit. Physical Exam:  There were no vitals taken for this visit. Wt Readings from Last 3 Encounters:   09/15/20 225 lb (102.1 kg)   06/15/20 228 lb (103.4 kg)   03/12/20 227 lb (103 kg)       Physical Exam  Constitutional:       General: He is not in acute distress. Appearance: He is well-developed. He is obese. HENT:      Head: Normocephalic and atraumatic. Neck:      Musculoskeletal: Neck supple. Vascular: No carotid bruit or JVD. Cardiovascular:      Rate and Rhythm: Normal rate and regular rhythm. Pulses: Normal pulses. Heart sounds: No murmur. No friction rub. No gallop.     Pulmonary: Breath sounds: Normal breath sounds. No wheezing or rales. Chest:      Chest wall: No tenderness. Abdominal:      General: Bowel sounds are normal. There is no distension. Palpations: Abdomen is soft. There is no mass. Tenderness: There is no abdominal tenderness. Comments: No abdominal bruit. Musculoskeletal:      Right lower leg: No edema. Left lower leg: No edema. Skin:     General: Skin is warm and dry. Neurological:      Mental Status: He is alert and oriented to person, place, and time.            Laboratory Tests:  Lab Results   Component Value Date    CREATININE 1.15 06/11/2020    BUN 17 06/11/2020     03/04/2020    K 4.5 06/11/2020     03/04/2020    CO2 28 03/04/2020     Lab Results   Component Value Date    MG 2.3 09/17/2015     Lab Results   Component Value Date    WBC 5.1 12/04/2019    HGB 14.3 12/04/2019    HCT 43.7 12/04/2019    MCV 95.4 11/30/2018     11/30/2018     Lab Results   Component Value Date    ALT 13 03/04/2020    AST 19 03/04/2020    ALKPHOS 67 03/04/2020    BILITOT 0.6 03/04/2020     Lab Results   Component Value Date    CKTOTAL 137 06/11/2020    CKMB 78.1 (H) 11/26/2018    TROPONINI 5.33 (H) 11/27/2018    TROPONINI 0.56 (H) 11/26/2018    TROPONINI 0.04 (H) 11/26/2018     Lab Results   Component Value Date    INR 1.3 09/28/2015    INR 1.3 09/14/2015    INR 1.1 09/13/2015    PROTIME 14.2 (H) 09/28/2015    PROTIME 14.6 (H) 09/14/2015    PROTIME 12.5 (H) 09/13/2015     Lab Results   Component Value Date    TSH 2.34 06/11/2020     Lab Results   Component Value Date    LABA1C 5.4 02/15/2017     No results found for: EAG  Lab Results   Component Value Date    CHOL 96 06/11/2020    CHOL 95 03/04/2020    CHOL 122 12/04/2019     Lab Results   Component Value Date    TRIG 147 06/11/2020    TRIG 167 03/04/2020    TRIG 155 12/04/2019     Lab Results   Component Value Date    HDL 34 (A) 06/11/2020    HDL 30 (A) 03/04/2020    HDL 36 12/04/2019     Lab Results   Component Value Date    LDLCALC 39 06/11/2020    LDLCALC 40 03/04/2020    LDLCALC 53 12/04/2019     Lab Results   Component Value Date    LABVLDL 29 09/04/2019    LABVLDL 22 11/27/2018    LABVLDL 38 09/11/2015     Lab Results   Component Value Date    CHOLHDLRATIO 2.8 06/11/2020    CHOLHDLRATIO 3.2 03/04/2020    CHOLHDLRATIO 3.1 12/04/2019       ASSESSMENT / PLAN:    -CAD: Status post CABG: Stable with no angina.  -Hyperlipidemia: Crestor.  -Hypertension: Controlled. -Borderline diabetes. -Hypothyroidism.  -History of Crohn's disease. We will continue current cardiac medications. Patient was advised to eat healthy and to keep exercising. Follow-up at the office in 1 year. The patient's current medication list, allergies, problem list and results of all previously ordered testing were reviewed at today's visit. {  Bella Zavala MD , [facility].   [facility]) Cardiology

## 2020-11-16 RX ORDER — ERGOCALCIFEROL 1.25 MG/1
CAPSULE ORAL
Qty: 12 CAPSULE | Refills: 1 | Status: SHIPPED
Start: 2020-11-16 | End: 2021-03-17 | Stop reason: SDUPTHER

## 2020-12-08 DIAGNOSIS — E03.9 HYPOTHYROIDISM, UNSPECIFIED TYPE: Chronic | ICD-10-CM

## 2020-12-08 DIAGNOSIS — I10 ESSENTIAL HYPERTENSION: ICD-10-CM

## 2020-12-08 DIAGNOSIS — I25.10 CORONARY ARTERY DISEASE INVOLVING NATIVE CORONARY ARTERY OF NATIVE HEART WITHOUT ANGINA PECTORIS: Chronic | ICD-10-CM

## 2020-12-08 DIAGNOSIS — E78.5 HYPERLIPIDEMIA, UNSPECIFIED HYPERLIPIDEMIA TYPE: Chronic | ICD-10-CM

## 2020-12-08 LAB
ALBUMIN SERPL-MCNC: NORMAL G/DL
ALP BLD-CCNC: NORMAL U/L
ALT SERPL-CCNC: NORMAL U/L
ANION GAP SERPL CALCULATED.3IONS-SCNC: NORMAL MMOL/L
AST SERPL-CCNC: NORMAL U/L
BASOPHILS ABSOLUTE: NORMAL
BASOPHILS RELATIVE PERCENT: NORMAL
BILIRUB SERPL-MCNC: NORMAL MG/DL
BUN BLDV-MCNC: NORMAL MG/DL
CALCIUM SERPL-MCNC: NORMAL MG/DL
CHLORIDE BLD-SCNC: NORMAL MMOL/L
CHOLESTEROL, TOTAL: NORMAL
CHOLESTEROL/HDL RATIO: NORMAL
CO2: NORMAL
CREAT SERPL-MCNC: NORMAL MG/DL
EOSINOPHILS ABSOLUTE: NORMAL
EOSINOPHILS RELATIVE PERCENT: NORMAL
GFR CALCULATED: NORMAL
GLUCOSE BLD-MCNC: NORMAL MG/DL
HCT VFR BLD CALC: NORMAL %
HDLC SERPL-MCNC: NORMAL MG/DL
HEMOGLOBIN: NORMAL
LDL CHOLESTEROL CALCULATED: NORMAL
LYMPHOCYTES ABSOLUTE: NORMAL
LYMPHOCYTES RELATIVE PERCENT: NORMAL
MCH RBC QN AUTO: NORMAL PG
MCHC RBC AUTO-ENTMCNC: NORMAL G/DL
MCV RBC AUTO: NORMAL FL
MONOCYTES ABSOLUTE: NORMAL
MONOCYTES RELATIVE PERCENT: NORMAL
NEUTROPHILS ABSOLUTE: NORMAL
NEUTROPHILS RELATIVE PERCENT: NORMAL
NONHDLC SERPL-MCNC: NORMAL MG/DL
PDW BLD-RTO: NORMAL %
PLATELET # BLD: NORMAL 10*3/UL
PMV BLD AUTO: NORMAL FL
POTASSIUM SERPL-SCNC: NORMAL MMOL/L
RBC # BLD: NORMAL 10*6/UL
SODIUM BLD-SCNC: NORMAL MMOL/L
T4 TOTAL: NORMAL
TOTAL PROTEIN: NORMAL
TRIGL SERPL-MCNC: NORMAL MG/DL
TSH SERPL DL<=0.05 MIU/L-ACNC: NORMAL M[IU]/L
VLDLC SERPL CALC-MCNC: NORMAL MG/DL
WBC # BLD: NORMAL 10*3/UL

## 2020-12-17 ENCOUNTER — OFFICE VISIT (OUTPATIENT)
Dept: PRIMARY CARE CLINIC | Age: 75
End: 2020-12-17
Payer: MEDICARE

## 2020-12-17 VITALS
TEMPERATURE: 97.9 F | BODY MASS INDEX: 28.76 KG/M2 | HEART RATE: 75 BPM | WEIGHT: 224 LBS | OXYGEN SATURATION: 98 % | DIASTOLIC BLOOD PRESSURE: 62 MMHG | SYSTOLIC BLOOD PRESSURE: 136 MMHG

## 2020-12-17 PROCEDURE — 99214 OFFICE O/P EST MOD 30 MIN: CPT | Performed by: FAMILY MEDICINE

## 2020-12-17 ASSESSMENT — ENCOUNTER SYMPTOMS
EYES NEGATIVE: 1
ALLERGIC/IMMUNOLOGIC NEGATIVE: 1
RESPIRATORY NEGATIVE: 1
GASTROINTESTINAL NEGATIVE: 1

## 2020-12-17 NOTE — PROGRESS NOTES
Left Ear: External ear normal.      Nose: Nose normal.   Eyes:      Conjunctiva/sclera: Conjunctivae normal.      Pupils: Pupils are equal, round, and reactive to light. Neck:      Musculoskeletal: Normal range of motion and neck supple. Cardiovascular:      Rate and Rhythm: Normal rate. Pulmonary:      Breath sounds: Normal breath sounds. Abdominal:      General: Bowel sounds are normal.      Palpations: Abdomen is soft. Musculoskeletal: Normal range of motion. Skin:     General: Skin is warm and dry. Neurological:      Mental Status: He is alert and oriented to person, place, and time. Psychiatric:         Behavior: Behavior normal.     Today's vitals physical examination stable. Medication as prescribed. Reassessment 3 months and sooner if problems. Plan Per Assessment:  Perry Cope was seen today for discuss labs and coronary artery disease. Diagnoses and all orders for this visit:    Coronary artery disease involving coronary bypass graft of native heart without angina pectoris  -     CBC Auto Differential; Future  -     Comprehensive Metabolic Panel; Future  -     Hemoglobin A1C; Future  -     Lipid Panel; Future  -     T4; Future  -     TSH without Reflex; Future    Non-STEMI (non-ST elevated myocardial infarction) (HCC)    Hypothyroidism, unspecified type  -     CBC Auto Differential; Future  -     Comprehensive Metabolic Panel; Future  -     Hemoglobin A1C; Future  -     Lipid Panel; Future  -     T4; Future  -     TSH without Reflex; Future    S/P CABG x 3  -     CBC Auto Differential; Future  -     Comprehensive Metabolic Panel; Future  -     Hemoglobin A1C; Future  -     Lipid Panel; Future  -     T4; Future  -     TSH without Reflex; Future    Hyperlipidemia, unspecified hyperlipidemia type  -     CBC Auto Differential; Future  -     Comprehensive Metabolic Panel; Future  -     Hemoglobin A1C; Future  -     Lipid Panel;  Future  -     T4; Future -     TSH without Reflex; Future            No follow-ups on file. Brionna Egan DO    Note was generated with the assistance of voice recognition software. Document was reviewed however may contain grammatical errors.

## 2021-03-11 LAB
ALBUMIN SERPL-MCNC: NORMAL G/DL
ALP BLD-CCNC: NORMAL U/L
ALT SERPL-CCNC: NORMAL U/L
ANION GAP SERPL CALCULATED.3IONS-SCNC: NORMAL MMOL/L
AST SERPL-CCNC: NORMAL U/L
AVERAGE GLUCOSE: NORMAL
BASOPHILS ABSOLUTE: NORMAL
BASOPHILS RELATIVE PERCENT: NORMAL
BILIRUB SERPL-MCNC: NORMAL MG/DL
BUN BLDV-MCNC: NORMAL MG/DL
CALCIUM SERPL-MCNC: NORMAL MG/DL
CHLORIDE BLD-SCNC: NORMAL MMOL/L
CHOLESTEROL, TOTAL: 93 MG/DL
CHOLESTEROL/HDL RATIO: 2.7
CO2: NORMAL
CREAT SERPL-MCNC: NORMAL MG/DL
EOSINOPHILS ABSOLUTE: NORMAL
EOSINOPHILS RELATIVE PERCENT: NORMAL
GFR CALCULATED: NORMAL
GLUCOSE BLD-MCNC: NORMAL MG/DL
HBA1C MFR BLD: 5.4 %
HCT VFR BLD CALC: NORMAL %
HDLC SERPL-MCNC: 34 MG/DL (ref 35–70)
HEMOGLOBIN: NORMAL
LDL CHOLESTEROL CALCULATED: 41 MG/DL (ref 0–160)
LYMPHOCYTES ABSOLUTE: NORMAL
LYMPHOCYTES RELATIVE PERCENT: NORMAL
MCH RBC QN AUTO: NORMAL PG
MCHC RBC AUTO-ENTMCNC: NORMAL G/DL
MCV RBC AUTO: NORMAL FL
MONOCYTES ABSOLUTE: NORMAL
MONOCYTES RELATIVE PERCENT: NORMAL
NEUTROPHILS ABSOLUTE: NORMAL
NEUTROPHILS RELATIVE PERCENT: NORMAL
NONHDLC SERPL-MCNC: 59 MG/DL
PDW BLD-RTO: NORMAL %
PLATELET # BLD: NORMAL 10*3/UL
PMV BLD AUTO: NORMAL FL
POTASSIUM SERPL-SCNC: NORMAL MMOL/L
RBC # BLD: NORMAL 10*6/UL
SODIUM BLD-SCNC: NORMAL MMOL/L
T4 TOTAL: 5.9
TOTAL PROTEIN: NORMAL
TRIGL SERPL-MCNC: 95 MG/DL
TSH SERPL DL<=0.05 MIU/L-ACNC: 1.11 UIU/ML
VLDLC SERPL CALC-MCNC: 2.7 MG/DL
WBC # BLD: NORMAL 10*3/UL

## 2021-03-17 ENCOUNTER — OFFICE VISIT (OUTPATIENT)
Dept: PRIMARY CARE CLINIC | Age: 76
End: 2021-03-17
Payer: MEDICARE

## 2021-03-17 VITALS
DIASTOLIC BLOOD PRESSURE: 70 MMHG | TEMPERATURE: 96.9 F | HEART RATE: 73 BPM | SYSTOLIC BLOOD PRESSURE: 120 MMHG | WEIGHT: 227 LBS | BODY MASS INDEX: 29.15 KG/M2 | OXYGEN SATURATION: 98 %

## 2021-03-17 DIAGNOSIS — I25.810 CORONARY ARTERY DISEASE INVOLVING CORONARY BYPASS GRAFT OF NATIVE HEART WITHOUT ANGINA PECTORIS: Primary | ICD-10-CM

## 2021-03-17 DIAGNOSIS — Z95.1 S/P CABG X 3: Chronic | ICD-10-CM

## 2021-03-17 DIAGNOSIS — I10 ESSENTIAL HYPERTENSION: ICD-10-CM

## 2021-03-17 DIAGNOSIS — E03.9 HYPOTHYROIDISM, UNSPECIFIED TYPE: Chronic | ICD-10-CM

## 2021-03-17 DIAGNOSIS — E78.5 HYPERLIPIDEMIA, UNSPECIFIED HYPERLIPIDEMIA TYPE: Chronic | ICD-10-CM

## 2021-03-17 DIAGNOSIS — I25.810 CORONARY ARTERY DISEASE INVOLVING CORONARY BYPASS GRAFT OF NATIVE HEART WITHOUT ANGINA PECTORIS: ICD-10-CM

## 2021-03-17 PROCEDURE — 99214 OFFICE O/P EST MOD 30 MIN: CPT | Performed by: FAMILY MEDICINE

## 2021-03-17 RX ORDER — LEVOTHYROXINE SODIUM 0.07 MG/1
TABLET ORAL
Qty: 90 TABLET | Refills: 3 | Status: SHIPPED
Start: 2021-03-17 | End: 2022-02-17

## 2021-03-17 RX ORDER — ERGOCALCIFEROL 1.25 MG/1
CAPSULE ORAL
Qty: 12 CAPSULE | Refills: 3 | Status: SHIPPED
Start: 2021-03-17 | End: 2022-03-22 | Stop reason: SDUPTHER

## 2021-03-17 ASSESSMENT — PATIENT HEALTH QUESTIONNAIRE - PHQ9
SUM OF ALL RESPONSES TO PHQ QUESTIONS 1-9: 0
2. FEELING DOWN, DEPRESSED OR HOPELESS: 0
SUM OF ALL RESPONSES TO PHQ QUESTIONS 1-9: 0
SUM OF ALL RESPONSES TO PHQ QUESTIONS 1-9: 0
1. LITTLE INTEREST OR PLEASURE IN DOING THINGS: 0

## 2021-03-17 NOTE — PROGRESS NOTES
tablet, Rfl: 3    aspirin 81 MG tablet, Take by mouth, Disp: , Rfl:     No Known Allergies    Social History     Tobacco Use    Smoking status: Former Smoker     Packs/day: 2.00     Years: 15.00     Pack years: 30.00     Types: Cigarettes     Quit date: 1985     Years since quittin.0    Smokeless tobacco: Former User     Types: Chew     Quit date: 1990   Substance Use Topics    Alcohol use: No     Alcohol/week: 0.0 standard drinks     Comment: Rarely    Drug use: No      Past Surgical History:   Procedure Laterality Date    CARDIAC CATHETERIZATION  9/11/15    Dr Doris Sevilla, multi vessel disease, CTS consult, Dr. Pita Reyes  2018    Dr. Chevy Jones- Bypass grafts patent   [address]  2018    CHOLECYSTECTOMY      COLONOSCOPY  10/23/2017    COLONOSCOPY  10/23/2017    CORONARY ARTERY BYPASS GRAFT  9/14/15    x3  Dr Nicolas Giron History   Problem Relation Age of Onset    Other Mother     Heart Attack Father     Heart Disease Father      Past Medical History:   Diagnosis Date    Atrial fibrillation ([address]     CAD (coronary artery disease)     Crohn's disease ([address]     Crohn's ([facility])    Essential hypertension 2019    Hyperlipidemia 2012    Hypothyroid     Iron deficiency anemia     Kidney stone     Platelets decreased (HCC)     low platelets during the ?  S/P CABG x 3 3/7/2017       Vitals:    21 1023   BP: 120/70   Pulse: 73   Temp: 96.9 °F (36.1 °C)   SpO2: 98%   Weight: 227 lb (103 kg)     BP Readings from Last 3 Encounters:   21 120/70   20 136/62   10/12/20 126/64    130/80    Physical Exam  Vitals signs and nursing note reviewed. Constitutional:       Appearance: He is well-developed. HENT:      Head: Normocephalic.       Right Ear: External ear normal.      Left Ear: External ear normal.      Nose: Nose normal.   Eyes:      Conjunctiva/sclera: Conjunctivae orders  -     vitamin D (ERGOCALCIFEROL) 1.25 MG (19559 UT) CAPS capsule; TAKE 1 CAPSULE BY MOUTH ONE TIME PER WEEK  -     levothyroxine (SYNTHROID) 75 MCG tablet; 1 qd            Return in about 6 months (around 9/17/2021) for physSlime Sheridan DO    Note was generated with the assistance of voice recognition software. Document was reviewed however may contain grammatical errors.

## 2021-07-12 RX ORDER — ROSUVASTATIN CALCIUM 10 MG/1
TABLET, COATED ORAL
Qty: 90 TABLET | Refills: 3 | Status: SHIPPED
Start: 2021-07-12 | End: 2022-07-05

## 2021-08-02 RX ORDER — METOPROLOL SUCCINATE 25 MG/1
TABLET, EXTENDED RELEASE ORAL
Qty: 45 TABLET | Refills: 3 | Status: SHIPPED
Start: 2021-08-02 | End: 2022-07-26

## 2021-09-15 LAB
ALBUMIN SERPL-MCNC: 3.9 G/DL
ALP BLD-CCNC: 79 U/L
ALT SERPL-CCNC: 13 U/L
ANION GAP SERPL CALCULATED.3IONS-SCNC: NORMAL MMOL/L
AST SERPL-CCNC: 22 U/L
BASOPHILS ABSOLUTE: 30 /ΜL
BASOPHILS RELATIVE PERCENT: 0.5 %
BILIRUB SERPL-MCNC: 0.6 MG/DL (ref 0.1–1.4)
BUN BLDV-MCNC: 16 MG/DL
CALCIUM SERPL-MCNC: 9 MG/DL
CHLORIDE BLD-SCNC: 108 MMOL/L
CHOLESTEROL, TOTAL: 100 MG/DL
CHOLESTEROL/HDL RATIO: 2.8
CO2: 28 MMOL/L
CREAT SERPL-MCNC: 1.15 MG/DL
EOSINOPHILS ABSOLUTE: 83 /ΜL
EOSINOPHILS RELATIVE PERCENT: 1.4 %
GFR CALCULATED: 62
GLUCOSE BLD-MCNC: 102 MG/DL
HCT VFR BLD CALC: 43.1 % (ref 41–53)
HDLC SERPL-MCNC: 36 MG/DL (ref 35–70)
HEMOGLOBIN: 14.7 G/DL (ref 13.5–17.5)
LDL CHOLESTEROL CALCULATED: 42 MG/DL (ref 0–160)
LYMPHOCYTES ABSOLUTE: 1646 /ΜL
LYMPHOCYTES RELATIVE PERCENT: 27.9 %
MCH RBC QN AUTO: 32.5 PG
MCHC RBC AUTO-ENTMCNC: 34.1 G/DL
MCV RBC AUTO: 95.1 FL
MONOCYTES ABSOLUTE: 502 /ΜL
MONOCYTES RELATIVE PERCENT: 8.5 %
NEUTROPHILS ABSOLUTE: 3640 /ΜL
NEUTROPHILS RELATIVE PERCENT: 61.7 %
NONHDLC SERPL-MCNC: 64 MG/DL
PDW BLD-RTO: 12.6 %
PLATELET # BLD: 180 K/ΜL
PMV BLD AUTO: 10.1 FL
POTASSIUM SERPL-SCNC: 4.4 MMOL/L
PROSTATE SPECIFIC ANTIGEN: 0.33 NG/ML
RBC # BLD: 4.53 10^6/ΜL
SODIUM BLD-SCNC: 142 MMOL/L
T4 TOTAL: 5.7
TOTAL PROTEIN: 6.5
TRIGL SERPL-MCNC: 132 MG/DL
TSH SERPL DL<=0.05 MIU/L-ACNC: 1.09 UIU/ML
VLDLC SERPL CALC-MCNC: NORMAL MG/DL
WBC # BLD: 5.9 10^3/ML

## 2021-09-16 DIAGNOSIS — I10 ESSENTIAL HYPERTENSION: ICD-10-CM

## 2021-09-16 DIAGNOSIS — E78.5 HYPERLIPIDEMIA, UNSPECIFIED HYPERLIPIDEMIA TYPE: Chronic | ICD-10-CM

## 2021-09-16 DIAGNOSIS — I25.810 CORONARY ARTERY DISEASE INVOLVING CORONARY BYPASS GRAFT OF NATIVE HEART WITHOUT ANGINA PECTORIS: ICD-10-CM

## 2021-09-16 DIAGNOSIS — E03.9 HYPOTHYROIDISM, UNSPECIFIED TYPE: Chronic | ICD-10-CM

## 2021-09-20 ENCOUNTER — OFFICE VISIT (OUTPATIENT)
Dept: PRIMARY CARE CLINIC | Age: 76
End: 2021-09-20
Payer: MEDICARE

## 2021-09-20 VITALS
SYSTOLIC BLOOD PRESSURE: 138 MMHG | HEART RATE: 65 BPM | WEIGHT: 224 LBS | DIASTOLIC BLOOD PRESSURE: 64 MMHG | OXYGEN SATURATION: 97 % | BODY MASS INDEX: 28.76 KG/M2 | TEMPERATURE: 96.7 F

## 2021-09-20 VITALS — WEIGHT: 224 LBS | HEIGHT: 74 IN | BODY MASS INDEX: 28.75 KG/M2

## 2021-09-20 DIAGNOSIS — Z72.89 OTHER PROBLEMS RELATED TO LIFESTYLE: ICD-10-CM

## 2021-09-20 DIAGNOSIS — I25.810 CORONARY ARTERY DISEASE INVOLVING CORONARY BYPASS GRAFT OF NATIVE HEART WITHOUT ANGINA PECTORIS: Primary | ICD-10-CM

## 2021-09-20 DIAGNOSIS — R73.01 IMPAIRED FASTING GLUCOSE: ICD-10-CM

## 2021-09-20 DIAGNOSIS — Z13.6 SCREENING FOR AAA (ABDOMINAL AORTIC ANEURYSM): ICD-10-CM

## 2021-09-20 DIAGNOSIS — I10 ESSENTIAL HYPERTENSION: ICD-10-CM

## 2021-09-20 DIAGNOSIS — Z23 NEED FOR PROPHYLACTIC VACCINATION AGAINST STREPTOCOCCUS PNEUMONIAE (PNEUMOCOCCUS): ICD-10-CM

## 2021-09-20 DIAGNOSIS — Z95.1 S/P CABG X 3: Chronic | ICD-10-CM

## 2021-09-20 DIAGNOSIS — E03.9 HYPOTHYROIDISM, UNSPECIFIED TYPE: Chronic | ICD-10-CM

## 2021-09-20 DIAGNOSIS — Z11.59 NEED FOR HEPATITIS C SCREENING TEST: ICD-10-CM

## 2021-09-20 DIAGNOSIS — Z00.00 ROUTINE GENERAL MEDICAL EXAMINATION AT A HEALTH CARE FACILITY: ICD-10-CM

## 2021-09-20 DIAGNOSIS — Z23 NEED FOR PROPHYLACTIC VACCINATION AND INOCULATION AGAINST VARICELLA: ICD-10-CM

## 2021-09-20 PROCEDURE — G0438 PPPS, INITIAL VISIT: HCPCS | Performed by: FAMILY MEDICINE

## 2021-09-20 PROCEDURE — 90670 PCV13 VACCINE IM: CPT | Performed by: FAMILY MEDICINE

## 2021-09-20 PROCEDURE — G0009 ADMIN PNEUMOCOCCAL VACCINE: HCPCS | Performed by: FAMILY MEDICINE

## 2021-09-20 PROCEDURE — 99214 OFFICE O/P EST MOD 30 MIN: CPT | Performed by: FAMILY MEDICINE

## 2021-09-20 ASSESSMENT — LIFESTYLE VARIABLES
HAS A RELATIVE, FRIEND, DOCTOR, OR ANOTHER HEALTH PROFESSIONAL EXPRESSED CONCERN ABOUT YOUR DRINKING OR SUGGESTED YOU CUT DOWN: NO
AUDIT-C TOTAL SCORE: 0
HOW OFTEN DURING THE LAST YEAR HAVE YOU FAILED TO DO WHAT WAS NORMALLY EXPECTED FROM YOU BECAUSE OF DRINKING: NEVER
HOW OFTEN DO YOU HAVE A DRINK CONTAINING ALCOHOL: MONTHLY OR LESS
HOW MANY STANDARD DRINKS CONTAINING ALCOHOL DO YOU HAVE ON A TYPICAL DAY: ONE OR TWO
HOW OFTEN DURING THE LAST YEAR HAVE YOU HAD A FEELING OF GUILT OR REMORSE AFTER DRINKING: 0
HOW OFTEN DURING THE LAST YEAR HAVE YOU NEEDED AN ALCOHOLIC DRINK FIRST THING IN THE MORNING TO GET YOURSELF GOING AFTER A NIGHT OF HEAVY DRINKING: NEVER
HAVE YOU OR SOMEONE ELSE BEEN INJURED AS A RESULT OF YOUR DRINKING: 0
HOW OFTEN DURING THE LAST YEAR HAVE YOU BEEN UNABLE TO REMEMBER WHAT HAPPENED THE NIGHT BEFORE BECAUSE YOU HAD BEEN DRINKING: 0
HAVE YOU OR SOMEONE ELSE BEEN INJURED AS A RESULT OF YOUR DRINKING: NO
AUDIT TOTAL SCORE: 0
HOW OFTEN DURING THE LAST YEAR HAVE YOU NEEDED AN ALCOHOLIC DRINK FIRST THING IN THE MORNING TO GET YOURSELF GOING AFTER A NIGHT OF HEAVY DRINKING: 0
HOW OFTEN DO YOU HAVE SIX OR MORE DRINKS ON ONE OCCASION: 0
HOW OFTEN DURING THE LAST YEAR HAVE YOU BEEN UNABLE TO REMEMBER WHAT HAPPENED THE NIGHT BEFORE BECAUSE YOU HAD BEEN DRINKING: NEVER
HOW OFTEN DURING THE LAST YEAR HAVE YOU FAILED TO DO WHAT WAS NORMALLY EXPECTED FROM YOU BECAUSE OF DRINKING: 0
HOW OFTEN DURING THE LAST YEAR HAVE YOU FOUND THAT YOU WERE NOT ABLE TO STOP DRINKING ONCE YOU HAD STARTED: 0
AUDIT TOTAL SCORE: 1
HOW OFTEN DO YOU HAVE SIX OR MORE DRINKS ON ONE OCCASION: NEVER
HOW OFTEN DURING THE LAST YEAR HAVE YOU FOUND THAT YOU WERE NOT ABLE TO STOP DRINKING ONCE YOU HAD STARTED: NEVER
HOW OFTEN DURING THE LAST YEAR HAVE YOU HAD A FEELING OF GUILT OR REMORSE AFTER DRINKING: NEVER
HOW OFTEN DO YOU HAVE A DRINK CONTAINING ALCOHOL: 1
HAS A RELATIVE, FRIEND, DOCTOR, OR ANOTHER HEALTH PROFESSIONAL EXPRESSED CONCERN ABOUT YOUR DRINKING OR SUGGESTED YOU CUT DOWN: 0
AUDIT-C TOTAL SCORE: 1
HOW MANY STANDARD DRINKS CONTAINING ALCOHOL DO YOU HAVE ON A TYPICAL DAY: 0

## 2021-09-20 ASSESSMENT — PATIENT HEALTH QUESTIONNAIRE - PHQ9
SUM OF ALL RESPONSES TO PHQ9 QUESTIONS 1 & 2: 0
1. LITTLE INTEREST OR PLEASURE IN DOING THINGS: 0
SUM OF ALL RESPONSES TO PHQ QUESTIONS 1-9: 0
2. FEELING DOWN, DEPRESSED OR HOPELESS: 0

## 2021-09-20 NOTE — PATIENT INSTRUCTIONS
Advance Directives: Care Instructions  Overview  An advance directive is a legal way to state your wishes at the end of your life. It tells your family and your doctor what to do if you can't say what you want. There are two main types of advance directives. You can change them any time your wishes change. Living will. This form tells your family and your doctor your wishes about life support and other treatment. The form is also called a declaration. Medical power of . This form lets you name a person to make treatment decisions for you when you can't speak for yourself. This person is called a health care agent (health care proxy, health care surrogate). The form is also called a durable power of  for health care. If you do not have an advance directive, decisions about your medical care may be made by a family member, or by a doctor or a  who doesn't know you. It may help to think of an advance directive as a gift to the people who care for you. If you have one, they won't have to make tough decisions by themselves. Follow-up care is a key part of your treatment and safety. Be sure to make and go to all appointments, and call your doctor if you are having problems. It's also a good idea to know your test results and keep a list of the medicines you take. What should you include in an advance directive? Many states have a unique advance directive form. (It may ask you to address specific issues.) Or you might use a universal form that's approved by many states. If your form doesn't tell you what to address, it may be hard to know what to include in your advance directive. Use the questions below to help you get started. · Who do you want to make decisions about your medical care if you are not able to? · What life-support measures do you want if you have a serious illness that gets worse over time or can't be cured? · What are you most afraid of that might happen? (Maybe you're afraid of having pain, losing your independence, or being kept alive by machines.)  · Where would you prefer to die? (Your home? A hospital? A nursing home?)  · Do you want to donate your organs when you die? · Do you want certain Zoroastrian practices performed before you die? When should you call for help? Be sure to contact your doctor if you have any questions. Where can you learn more? Go to [email or web address] and sign in to your [employer] account. Enter R264 in the Xeros box to learn more about \"Advance Directives: Care Instructions. \"     If you do not have an account, please click on the \"Sign Up Now\" link. Current as of: March 17, 2021               Content Version: 12.9  © 8375-3870 Healthwise, [employer]. Care instructions adapted under license by [facility] ([facility]). If you have questions about a medical condition or this instruction, always ask your healthcare professional. [place] [address] any warranty or liability for your use of this information. Personalized Preventive Plan for Roberto Byers - 9/20/2021  Medicare offers a range of preventive health benefits. Some of the tests and screenings are paid in full while other may be subject to a deductible, co-insurance, and/or copay. Some of these benefits include a comprehensive review of your medical history including lifestyle, illnesses that may run in your family, and various assessments and screenings as appropriate. After reviewing your medical record and screening and assessments performed today your provider may have ordered immunizations, labs, imaging, and/or referrals for you. A list of these orders (if applicable) as well as your Preventive Care list are included within your After Visit Summary for your review.     Other Preventive Recommendations:    · A preventive eye exam performed by an eye specialist is recommended every 1-2 years to screen for glaucoma; cataracts, macular degeneration, and other eye disorders. · A preventive dental visit is recommended every 6 months. · Try to get at least 150 minutes of exercise per week or 10,000 steps per day on a pedometer . · Order or download the FREE \"Exercise & Physical Activity: Your Everyday Guide\" from [employer]. Call 0-896.546.2246 or search [employer] online. · You need 5388-3214 mg of calcium and 0599-7345 IU of vitamin D per day. It is possible to meet your calcium requirement with diet alone, but a vitamin D supplement is usually necessary to meet this goal.  · When exposed to the sun, use a sunscreen that protects against both UVA and UVB radiation with an SPF of 30 or greater. Reapply every 2 to 3 hours or after sweating, drying off with a towel, or swimming. · Always wear a seat belt when traveling in a car. Always wear a helmet when riding a bicycle or motorcycle.

## 2021-09-20 NOTE — PROGRESS NOTES
21     Tori Stanford    : 1945 Sex: male   Age: 77 y.o. Chief Complaint   Patient presents with    Discuss Labs    Coronary Artery Disease       Prior to Admission medications    Medication Sig Start Date End Date Taking? Authorizing Provider   metroNIDAZOLE (METROCREAM) 0.75 % cream APPLY TO FACE EVERY DAY AT BEDTIME 21  Yes Historical Provider, MD   metoprolol succinate (TOPROL XL) 25 MG extended release tablet TAKE ONE-HALF (1/2) TABLET DAILY 21  Yes Gloria Medina DO   rosuvastatin (CRESTOR) 10 MG tablet TAKE 1 TABLET DAILY 21  Yes Gloria Medina DO   vitamin D (ERGOCALCIFEROL) 1.25 MG (27793 UT) CAPS capsule TAKE 1 CAPSULE BY MOUTH ONE TIME PER WEEK 3/17/21  Yes Gloria Medina DO   levothyroxine (SYNTHROID) 75 MCG tablet 1 qd 3/17/21  Yes Gloria Medina DO   aspirin 81 MG tablet Take by mouth 11/30/15  Yes Historical Provider, MD          HPI: Patient evaluated today with coronary artery disease hypertension hypothyroid impaired fasting glucose all of which have been stable. Doing very well with current medications. Systems review is overall stable. Review of Systems   Constitutional: Negative. HENT: Negative. Eyes: Negative. Respiratory: Negative. Gastrointestinal: Negative. Endocrine: Negative. Genitourinary: Negative. Musculoskeletal: Negative. Skin: Negative. Allergic/Immunologic: Negative. Neurological: Negative. Hematological: Negative. Psychiatric/Behavioral: Negative.                Current Outpatient Medications:     metroNIDAZOLE (METROCREAM) 0.75 % cream, APPLY TO FACE EVERY DAY AT BEDTIME, Disp: , Rfl:     metoprolol succinate (TOPROL XL) 25 MG extended release tablet, TAKE ONE-HALF (1/2) TABLET DAILY, Disp: 45 tablet, Rfl: 3    rosuvastatin (CRESTOR) 10 MG tablet, TAKE 1 TABLET DAILY, Disp: 90 tablet, Rfl: 3    vitamin D (ERGOCALCIFEROL) 1.25 MG (32020 UT) CAPS capsule, TAKE 1 CAPSULE BY MOUTH ONE TIME PER WEEK, Disp: 12 capsule, Rfl: 3    levothyroxine (SYNTHROID) 75 MCG tablet, 1 qd, Disp: 90 tablet, Rfl: 3    aspirin 81 MG tablet, Take by mouth, Disp: , Rfl:     No Known Allergies    Social History     Tobacco Use    Smoking status: Former Smoker     Packs/day: 2.00     Years: 15.00     Pack years: 30.00     Types: Cigarettes     Quit date: 1985     Years since quittin.6    Smokeless tobacco: Former User     Types: Chew     Quit date: 1990   Vaping Use    Vaping Use: Never used   Substance Use Topics    Alcohol use: No     Alcohol/week: 0.0 standard drinks     Comment: Rarely    Drug use: No      Past Surgical History:   Procedure Laterality Date    CARDIAC CATHETERIZATION  9/11/15    Dr Tyshawn Silva, multi vessel disease, CTS consult, Dr. Pasha Parada  2018    Dr. Hays Lunch- Bypass grafts patent   [address]  2018    CHOLECYSTECTOMY      COLONOSCOPY  10/23/2017    COLONOSCOPY  10/23/2017    CORONARY ARTERY BYPASS GRAFT  9/14/15    x3  Dr Bindu Singh History   Problem Relation Age of Onset    Other Mother     Heart Attack Father     Heart Disease Father      Past Medical History:   Diagnosis Date    [facility]     CAD (coronary artery disease)     Crohn's disease ([facility]     Crohn's ([facility])    Essential hypertension 2019    Hyperlipidemia 2012    Hypothyroid     Iron deficiency anemia     Kidney stone     Platelets decreased (HCC)     low platelets during the ?  S/P CABG x 3 3/7/2017       Vitals:    21 1018   BP: 138/64   Pulse: 65   Temp: 96.7 °F (35.9 °C)   SpO2: 97%   Weight: 224 lb (101.6 kg)     BP Readings from Last 3 Encounters:   21 138/64   21 120/70   20 136/62        Physical Exam  Vitals and nursing note reviewed. Constitutional:       Appearance: He is well-developed. HENT:      Head: Normocephalic.       Right Ear: External ear normal.      Left Ear: External ear normal.      Nose: Nose normal.   Eyes:      Conjunctiva/sclera: Conjunctivae normal.      Pupils: Pupils are equal, round, and reactive to light. Cardiovascular:      Rate and Rhythm: Normal rate. Pulmonary:      Breath sounds: Normal breath sounds. Abdominal:      General: Bowel sounds are normal.      Palpations: Abdomen is soft. Musculoskeletal:         General: Normal range of motion. Cervical back: Normal range of motion and neck supple. Skin:     General: Skin is warm and dry. Neurological:      Mental Status: He is alert and oriented to person, place, and time. Psychiatric:         Behavior: Behavior normal.        Today's vitals physical examination stable. I will maintain present meds and care. Follow-up visit 6 months and sooner if problems. Blood work at that time.       Lab Results   Component Value Date    TSH 1.09 09/14/2021    TSH 1.11 03/11/2021    U9MAJRM 5.7 09/14/2021    Y5GIQDI 5.9 03/11/2021    FT3 2.4 08/03/2011     Lab Results   Component Value Date    CHOL 100 09/14/2021    CHOL 93 03/11/2021     Lab Results   Component Value Date    TRIG 132 09/14/2021    TRIG 95 03/11/2021     Lab Results   Component Value Date    HDL 36 09/14/2021    HDL 34 (A) 03/11/2021     No results found for: [facility]  Lab Results   Component Value Date    LABVLDL 29 09/04/2019    LABVLDL 22 11/27/2018    VLDL 2.7 03/11/2021     Lab Results   Component Value Date    CHOLHDLRATIO 2.8 09/14/2021    CHOLHDLRATIO 2.7 03/11/2021     Lab Results   Component Value Date    WBC 5.9 09/14/2021    HGB 14.7 09/14/2021    HCT 43.1 09/14/2021    MCV 95.1 09/14/2021     09/14/2021    LYMPHOPCT 27.9 09/14/2021    RBC 4.53 09/14/2021    MCH 32.5 09/14/2021    MCHC 34.1 09/14/2021    RDW 12.6 09/14/2021     Lab Results   Component Value Date     09/14/2021    K 4.4 09/14/2021     09/14/2021    CO2 28 09/14/2021    BUN 16 09/14/2021 CREATININE 1.15 09/14/2021    GLUCOSE 102 09/14/2021    CALCIUM 9.0 09/14/2021    PROT 6.6 09/04/2019    LABALBU 3.9 09/14/2021    BILITOT 0.6 09/14/2021    ALKPHOS 79 09/14/2021    AST 22 09/14/2021    ALT 13 09/14/2021    LABGLOM 62 09/14/2021    GFRAA >60 09/04/2019        Lab Results   Component Value Date    PSA 0.33 09/14/2021    PSA 0.3 12/04/2019    PSA 0.50 09/15/2014      Lab Results   Component Value Date    LABA1C 5.4 03/11/2021    LABA1C 5.4 02/15/2017    LABA1C 5.5 09/13/2015     No results found for: EAG     Plan Per Assessment:  Leela Geronimo was seen today for discuss labs and coronary artery disease. Diagnoses and all orders for this visit:    Coronary artery disease involving coronary bypass graft of native heart without angina pectoris    Essential hypertension    Hypothyroidism, unspecified type    S/P CABG x 3            No follow-ups on file. Cyndy Sanchez DO    Note was generated with the assistance of voice recognition software. Document was reviewed however may contain grammatical errors.

## 2021-09-20 NOTE — PROGRESS NOTES
Medicare Annual Wellness Visit  Name: Tessa Living Date: 2021   MRN: <[id number]> Sex: Male   Age: 77 y.o. Ethnicity: Non- / Non    : 1945 Race: White (non-)      Prabhu Alcantar is here for Medicare AWV    Screenings for behavioral, psychosocial and functional/safety risks, and cognitive dysfunction are all negative except as indicated below. These results, as well as other patient data from the [address] form, are documented in Flowsheets linked to this Encounter. No Known Allergies    Prior to Visit Medications    Medication Sig Taking? Authorizing Provider   metroNIDAZOLE (METROCREAM) 0.75 % cream APPLY TO FACE EVERY DAY AT BEDTIME Yes Historical Provider, MD   metoprolol succinate (TOPROL XL) 25 MG extended release tablet TAKE ONE-HALF (1/2) TABLET DAILY Yes Real Sabal Traikoff, DO   rosuvastatin (CRESTOR) 10 MG tablet TAKE 1 TABLET DAILY Yes Real Sabal Traikoff, DO   vitamin D (ERGOCALCIFEROL) 1.25 MG (73728 UT) CAPS capsule TAKE 1 CAPSULE BY MOUTH ONE TIME PER WEEK Yes Real Sabal Traikoff, DO   levothyroxine (SYNTHROID) 75 MCG tablet 1 qd Yes Real Sabal Traikoff, DO   aspirin 81 MG tablet Take by mouth Yes Historical Provider, MD       Past Medical History:   Diagnosis Date    Atrial fibrillation ([facility]     CAD (coronary artery disease)     Crohn's disease ([facility]     Crohn's (Elsa Richer)    Essential hypertension 2019    Hyperlipidemia 2012    Hypothyroid     Iron deficiency anemia     Kidney stone     Platelets decreased (HCC)     low platelets during the ?     S/P CABG x 3 3/7/2017       Past Surgical History:   Procedure Laterality Date    CARDIAC CATHETERIZATION  9/11/15    Dr Rich Jones, multi vessel disease, CTS consult, Dr. Connie Desouza  2018    Dr. Geo Block- Bypass grafts patent   [address]  2018    CHOLECYSTECTOMY      COLONOSCOPY  10/23/2017    COLONOSCOPY  10/23/2017    CORONARY ARTERY BYPASS GRAFT  9/14/15    x3  Dr Matehw Short History   Problem Relation Age of Onset    Other Mother     Heart Attack Father     Heart Disease Father        CareTeam (Including outside providers/suppliers regularly involved in providing care):   Patient Care Team:  Kris Stevens DO as PCP - General (Family Medicine)  Kris Stevens [facility]  Ady Aburto MD as Consulting Physician (Cardiology)  No Mann DO as Surgeon (Cardiothoracic Surgery)    Wt Readings from Last 3 Encounters:   09/20/21 224 lb (101.6 kg)   09/20/21 224 lb (101.6 kg)   03/17/21 227 lb (103 kg)     Vitals:    09/20/21 1017   Weight: 224 lb (101.6 kg)   Height: 6' 2\" (1.88 m)     Body mass index is 28.76 kg/m². Based upon direct observation of the patient, evaluation of cognition reveals recent and remote memory intact. Patient's complete Health Risk Assessment and screening values have been reviewed and are found in Flowsheets. The following problems were reviewed today and where indicated follow up appointments were made and/or referrals ordered. Positive Risk Factor Screenings with Interventions:          General Health and ACP:  General  In general, how would you say your health is?: Very Good  In the past 7 days, have you experienced any of the following? New or Increased Pain, New or Increased Fatigue, Loneliness, Social Isolation, Stress or Anger?: (!) Anger  Do you get the social and emotional support that you need?: Yes  Do you have a Living Will?: Yes  Advance Directives     Power of  Living Will ACP-Advance Directive ACP-Power of     Not on File Filed on 02/25/12 Filed Not on File      General Health Risk Interventions:  · Discussed today and in place.      Hearing/Vision:  No exam data present  Hearing/Vision  Do you or your family notice any trouble with your hearing that hasn't been managed with hearing aids?: No  Do you have difficulty driving, watching TV, or doing any of your daily activities because of your eyesight?: No  Have you had an eye exam within the past year?: (!) No  Hearing/Vision Interventions:  · No current visual or hearing concerns      Personalized Preventive Plan   Current Health Maintenance Status    There is no immunization history on file for this patient. Health Maintenance   Topic Date Due    AAA screen  Never done    Hepatitis C screen  Never done    COVID-19 Vaccine (1) Never done    Pneumococcal 65+ years Vaccine (1 of 1 - PPSV23) Never done    Shingles Vaccine (2 of 3) 07/29/2013    Annual Wellness Visit (AWV)  Never done    Flu vaccine (1) Never done    Lipid screen  09/14/2022    TSH testing  09/14/2022    Potassium monitoring  09/14/2022    Creatinine monitoring  09/14/2022    DTaP/Tdap/Td vaccine (2 - Td or Tdap) 03/04/2024    Colon cancer screen colonoscopy  10/23/2027    Hepatitis A vaccine  Aged Out    Hepatitis B vaccine  Aged Out    Hib vaccine  Aged Out    Meningococcal (ACWY) vaccine  Aged Out     Recommendations for [employer] Due: see orders and patient instructions/AVS.  . Recommended screening schedule for the next 5-10 years is provided to the patient in written form: see Patient Instructions/AVS.    There are no diagnoses linked to this encounter.

## 2021-10-25 NOTE — PROGRESS NOTES
3/12/20     Eder Colmenares    : 1945 Sex: male   Age: 69 y.o. Chief Complaint   Patient presents with    Coronary Artery Disease    Discuss Labs       Prior to Admission medications    Medication Sig Start Date End Date Taking? Authorizing Provider   vitamin D (ERGOCALCIFEROL) 1.25 MG (84362 UT) CAPS capsule Take 1 capsule by mouth once a week 3/12/20  Yes Keyla Haley,    aspirin 81 MG tablet Take by mouth 11/30/15  Yes Historical Provider, MD   levothyroxine (SYNTHROID) 50 MCG tablet Take 1 tablet by mouth daily 19  Yes Stacia Medina,    rosuvastatin (CRESTOR) 10 MG tablet Take 1 tablet by mouth daily 19  Yes Stacia Medina,    metoprolol succinate (TOPROL XL) 25 MG extended release tablet Take 1 tablet by mouth daily 1/2 by mouth daily 19  Yes Keyla Haley DO          HPI: Christa Redhead seen today follow-up on coronary artery disease hypothyroid hypertension hyperlipidemia. Medications currently well-tolerated. Overall systems review stable. No problems chest pain. Medications reviewed as prescribed. Review of Systems   Constitutional: Negative. HENT: Negative. Eyes: Negative. Respiratory: Negative. Gastrointestinal: Negative. Endocrine: Negative. Genitourinary: Negative. Musculoskeletal: Negative. Skin: Negative. Allergic/Immunologic: Negative. Neurological: Negative. Hematological: Negative. Psychiatric/Behavioral: Negative. Review stable as noted no additional complaints.         Current Outpatient Medications:     vitamin D (ERGOCALCIFEROL) 1.25 MG (66546 UT) CAPS capsule, Take 1 capsule by mouth once a week, Disp: 4 capsule, Rfl: 3    aspirin 81 MG tablet, Take by mouth, Disp: , Rfl:     levothyroxine (SYNTHROID) 50 MCG tablet, Take 1 tablet by mouth daily, Disp: 90 tablet, Rfl: 3    rosuvastatin (CRESTOR) 10 MG tablet, Take 1 tablet by mouth daily, Disp: 90 tablet, Rfl: 3    metoprolol succinate (TOPROL XL) 25 Eyes:      Conjunctiva/sclera: Conjunctivae normal.      Pupils: Pupils are equal, round, and reactive to light. Neck:      Musculoskeletal: Normal range of motion and neck supple. Cardiovascular:      Rate and Rhythm: Normal rate. Pulmonary:      Breath sounds: Normal breath sounds. Abdominal:      General: Bowel sounds are normal.      Palpations: Abdomen is soft. Musculoskeletal: Normal range of motion. Skin:     General: Skin is warm and dry. Neurological:      Mental Status: He is alert and oriented to person, place, and time. Psychiatric:         Behavior: Behavior normal.     Present vitals physical examination stable. Labs reviewed and currently stable cholesterol 95 HDL 30 LDL 40 glucose at 97. Maintain present meds and care reassessment in 3 months with blood work at that time. Plan Per Assessment:  Clarisa Kaur was seen today for coronary artery disease and discuss labs. Diagnoses and all orders for this visit:    Coronary artery disease involving native coronary artery of native heart without angina pectoris  -     Comprehensive Metabolic Panel; Future  -     Lipid Panel; Future  -     CK; Future  -     T4; Future  -     TSH without Reflex; Future  -     CBC Auto Differential; Future    Essential hypertension  -     Comprehensive Metabolic Panel; Future  -     Lipid Panel; Future  -     CK; Future  -     T4; Future  -     TSH without Reflex; Future  -     CBC Auto Differential; Future    Hypothyroidism, unspecified type  -     Comprehensive Metabolic Panel; Future  -     Lipid Panel; Future  -     CK; Future  -     T4; Future  -     TSH without Reflex; Future  -     CBC Auto Differential; Future    S/P CABG x 3  -     Comprehensive Metabolic Panel; Future  -     Lipid Panel; Future  -     CK; Future  -     T4; Future  -     TSH without Reflex;  Future  -     CBC Auto Differential; Future    Hyperlipidemia, unspecified hyperlipidemia type  -     Comprehensive Metabolic Panel; Future  -     Lipid Panel; Future  -     CK; Future  -     T4; Future  -     TSH without Reflex; Future  -     CBC Auto Differential; Future    Other orders  -     vitamin D (ERGOCALCIFEROL) 1.25 MG (82119 UT) CAPS capsule; Take 1 capsule by mouth once a week            Return in about 3 months (around 6/12/2020). Niecy Waldron,     Note was generated with the assistance of voice recognition software. Document was reviewed however may contain grammatical errors. no

## 2021-11-02 ENCOUNTER — OFFICE VISIT (OUTPATIENT)
Dept: CARDIOLOGY CLINIC | Age: 76
End: 2021-11-02
Payer: MEDICARE

## 2021-11-02 VITALS
HEIGHT: 74 IN | HEART RATE: 56 BPM | DIASTOLIC BLOOD PRESSURE: 76 MMHG | RESPIRATION RATE: 18 BRPM | SYSTOLIC BLOOD PRESSURE: 134 MMHG | BODY MASS INDEX: 28.88 KG/M2 | WEIGHT: 225 LBS

## 2021-11-02 DIAGNOSIS — I25.119 CORONARY ARTERY DISEASE INVOLVING NATIVE HEART WITH ANGINA PECTORIS, UNSPECIFIED VESSEL OR LESION TYPE (HCC): Primary | ICD-10-CM

## 2021-11-02 PROCEDURE — 93000 ELECTROCARDIOGRAM COMPLETE: CPT | Performed by: INTERNAL MEDICINE

## 2021-11-02 PROCEDURE — 99213 OFFICE O/P EST LOW 20 MIN: CPT | Performed by: INTERNAL MEDICINE

## 2021-11-02 ASSESSMENT — ENCOUNTER SYMPTOMS
ABDOMINAL PAIN: 0
DIARRHEA: 0
NAUSEA: 0
SHORTNESS OF BREATH: 0
WHEEZING: 0
COUGH: 0
BLOOD IN STOOL: 0
VOMITING: 0
BACK PAIN: 0
CONSTIPATION: 0

## 2021-11-02 NOTE — PROGRESS NOTES
OUTPATIENT CARDIOLOGY FOLLOW-UP    HPI:    Name: Rina Aschoff    Age: 77 y.o. Primary Care Physician: Los Styles DO    Date of Service: 11/2/2021    Chief Complaint:   Chief Complaint   Patient presents with    Coronary Artery Disease     annual ov         History of present illness :   81-year-old obese gentleman who comes today for one-year follow-up visit. [facility] has history of hypertension, hyperlipidemia, hypothyroidism, borderline diabetes, Crohn's disease, thrombocytopenia in 1980s, CAD, status post triple-vessel CABG in September 2015 with LIMA to LAD, SVG to ramus branch and SVG to obtuse marginal branch.  He had a non-ST elevation myocardial infarction in November 2018 and underwent left heart catheterization by Dr. Mai Nicole angiogram revealed thrombotic proximal LAD prior to moderate size diagonal branch followed up by 100% mid LAD stenosis,  60% PLV branch stenosis, patent grafts with an ejection fraction of 55%.  No PCI was attempted due to the thrombotic proximal LAD. Patient has been fairly active. He has been playing [employer] ball 3-4 times a week. He denies chest discomfort or dyspnea on exertion. He denies palpitations, dizziness or syncope. He denies orthopnea, PND or lower extremity edema. KG done today revealed sinus bradycardia at 56 bpm, left atrial enlargement, right bundle branch block with nonspecific ST-T wave changes. Review of Systems:   Review of Systems   Constitutional: Negative for chills, fatigue and fever. HENT: Negative for nosebleeds. Respiratory: Negative for cough, shortness of breath and wheezing. Gastrointestinal: Negative for abdominal pain, blood in stool, constipation, diarrhea, nausea and vomiting. Occasional GERD. Genitourinary: Negative for dysuria and hematuria. Musculoskeletal: Negative for back pain, joint swelling and myalgias. Neurological: Negative for syncope and light-headedness.    Psychiatric/Behavioral: The patient is not nervous/anxious. Past Medical History:  Past Medical History:   Diagnosis Date    Atrial fibrillation ([facility] [address]     CAD (coronary artery disease)     Crohn's disease ([facility] [address]     Crohn's ([facility])    Essential hypertension 2019    Hyperlipidemia 2012    Hypothyroid     Iron deficiency anemia     Kidney stone     Platelets decreased (HCC)     low platelets during the ?  S/P CABG x 3 3/7/2017       Past Surgical History:  Past Surgical History:   Procedure Laterality Date    CARDIAC CATHETERIZATION  9/11/15    Dr Sarah Weiss, multi vessel disease, CTS consult, Dr. Alberto Figures  2018    Dr. Mindy Meneses- Bypass grafts patent   [address]  2018    CHOLECYSTECTOMY      COLONOSCOPY  10/23/2017    COLONOSCOPY  10/23/2017    CORONARY ARTERY BYPASS GRAFT  9/14/15    x3  Dr Arianna Bah         Family History:  Family History   Problem Relation Age of Onset    Other Mother     Heart Attack Father     Heart Disease Father        Social History:  Social History     Socioeconomic History    Marital status:      Spouse name: Not on file    Number of children: Not on file    Years of education: Not on file    Highest education level: Not on file   Occupational History    Not on file   Tobacco Use    Smoking status: Former Smoker     Packs/day: 2.00     Years: 15.00     Pack years: 30.00     Types: Cigarettes     Quit date: 1985     Years since quittin.7    Smokeless tobacco: Former User     Types: Chew     Quit date: 1990   Vaping Use    Vaping Use: Never used   Substance and Sexual Activity    Alcohol use: No     Alcohol/week: 0.0 standard drinks     Comment: Rarely    Drug use: No    Sexual activity: Not on file   Other Topics Concern    Not on file   Social History Narrative    Patient states he drinks 3 -4 cups of coffee daily.      Social Determinants of Health Financial Resource Strain:     Difficulty of Paying Living Expenses:    Food Insecurity:     Worried About Running Out of Food in the Last Year:     [address] in the Last Year:    Transportation Needs:     Lack of Transportation (Medical):  Lack of Transportation (Non-Medical):    Physical Activity:     Days of Exercise per Week:     Minutes of Exercise per Session:    Stress:     Feeling of Stress :    Social Connections:     Frequency of Communication with Friends and Family:     Frequency of Social Gatherings with Friends and Family:     Attends [employer] Services:     Active Member of Clubs or Organizations:     Attends Club or Organization Meetings:     Marital Status:    Intimate Partner Violence:     Fear of Current or Ex-Partner:     Emotionally Abused:     Physically Abused:     Sexually Abused: Allergies:  No Known Allergies    Current Medications:  Current Outpatient Medications   Medication Sig Dispense Refill    metroNIDAZOLE (METROCREAM) 0.75 % cream APPLY TO FACE EVERY DAY AT BEDTIME      metoprolol succinate (TOPROL XL) 25 MG extended release tablet TAKE ONE-HALF (1/2) TABLET DAILY 45 tablet 3    rosuvastatin (CRESTOR) 10 MG tablet TAKE 1 TABLET DAILY 90 tablet 3    vitamin D (ERGOCALCIFEROL) 1.25 MG (18214 UT) CAPS capsule TAKE 1 CAPSULE BY MOUTH ONE TIME PER WEEK 12 capsule 3    levothyroxine (SYNTHROID) 75 MCG tablet 1 qd 90 tablet 3    aspirin 81 MG tablet Take by mouth       No current facility-administered medications for this visit. Physical Exam:  /76   Pulse 56   Resp 18   Ht 6' 2\" (1.88 m)   Wt 225 lb (102.1 kg)   BMI 28.89 kg/m²   Wt Readings from Last 3 Encounters:   11/02/21 225 lb (102.1 kg)   09/20/21 224 lb (101.6 kg)   09/20/21 224 lb (101.6 kg)       Physical Exam  Constitutional:       General: He is not in acute distress. Appearance: He is well-developed. He is obese. HENT:      Head: Normocephalic and atraumatic. Neck:      Vascular: No carotid bruit or JVD. Cardiovascular:      Rate and Rhythm: Regular rhythm. Bradycardia present. Heart sounds: No murmur heard. No friction rub. No gallop. Comments: Splitting of S2 noted. Pulmonary:      Breath sounds: Normal breath sounds. No wheezing or rales. Chest:      Chest wall: No tenderness. Abdominal:      General: Bowel sounds are normal. There is no distension. Palpations: Abdomen is soft. There is no mass. Tenderness: There is no abdominal tenderness. Comments: No abdominal bruit. Musculoskeletal:      Cervical back: Neck supple. Right lower leg: No edema. Left lower leg: No edema. Skin:     General: Skin is warm and dry. Neurological:      Mental Status: He is alert and oriented to person, place, and time.            Laboratory Tests:  Lab Results   Component Value Date    CREATININE 1.15 09/14/2021    BUN 16 09/14/2021     09/14/2021    K 4.4 09/14/2021     09/14/2021    CO2 28 09/14/2021     Lab Results   Component Value Date    MG 2.3 09/17/2015     Lab Results   Component Value Date    WBC 5.9 09/14/2021    HGB 14.7 09/14/2021    HCT 43.1 09/14/2021    MCV 95.1 09/14/2021     09/14/2021     Lab Results   Component Value Date    ALT 13 09/14/2021    AST 22 09/14/2021    ALKPHOS 79 09/14/2021    BILITOT 0.6 09/14/2021     Lab Results   Component Value Date    CKTOTAL 137 06/11/2020    CKMB 78.1 (H) 11/26/2018    TROPONINI 5.33 (H) 11/27/2018    TROPONINI 0.56 (H) 11/26/2018    TROPONINI 0.04 (H) 11/26/2018     Lab Results   Component Value Date    INR 1.3 09/28/2015    INR 1.3 09/14/2015    INR 1.1 09/13/2015    PROTIME 14.2 (H) 09/28/2015    PROTIME 14.6 (H) 09/14/2015    PROTIME 12.5 (H) 09/13/2015     Lab Results   Component Value Date    TSH 1.09 09/14/2021     Lab Results   Component Value Date    LABA1C 5.4 03/11/2021       Lab Results   Component Value Date    CHOL 100 09/14/2021    CHOL 93 03/11/2021    CHOL 96 06/11/2020     Lab Results   Component Value Date    TRIG 132 09/14/2021    TRIG 95 03/11/2021    TRIG 147 06/11/2020     Lab Results   Component Value Date    HDL 36 09/14/2021    HDL 34 (A) 03/11/2021    HDL 34 (A) 06/11/2020     Lab Results   Component Value Date    LDLCALC 42 09/14/2021    LDLCALC 41 03/11/2021    LDLCALC 39 06/11/2020     Lab Results   Component Value Date    LABVLDL 29 09/04/2019    LABVLDL 22 11/27/2018    LABVLDL 38 09/11/2015    VLDL 2.7 03/11/2021     Lab Results   Component Value Date    CHOLHDLRATIO 2.8 09/14/2021    CHOLHDLRATIO 2.7 03/11/2021    CHOLHDLRATIO 2.8 06/11/2020       Cardiac Tests:    Echocardiogram done on 3/17/2019:  Left ventricular size is grossly normal.   Low normal systolic function with LVEF estimated at 50-55%. There is doppler evidence of stage I diastolic dysfunction. Normal left atrium by volume index. No significant valvular abnormalities. Normal estimated PA pressure    ASSESSMENT / PLAN:  -CAD: Status post CABG: Stable with no angina.  -Hyperlipidemia: His lipid profile is under control on Crestor.  -Hypertension: Controlled. -Grade 1 diastolic dysfunction: Clinically compensated. -Hypothyroidism.  -History of Crohn's disease.     We will continue current cardiac medications. Patient was advised to eat healthy and to keep exercising. Patient was advised to lose weight. Will follow-up at the office in 1 year. The patient's current medication list, allergies, problem list and results of all previously ordered testing were reviewed at today's visit. {  Lady Porter MD , [facility].   [facility]) Cardiology

## 2022-02-17 RX ORDER — LEVOTHYROXINE SODIUM 0.07 MG/1
TABLET ORAL
Qty: 90 TABLET | Refills: 3 | Status: SHIPPED | OUTPATIENT
Start: 2022-02-17

## 2022-03-17 LAB
ALBUMIN SERPL-MCNC: 3.7 G/DL
ALP BLD-CCNC: 76 U/L
ALT SERPL-CCNC: 13 U/L
ANION GAP SERPL CALCULATED.3IONS-SCNC: NORMAL MMOL/L
ANTIBODY: NORMAL
AST SERPL-CCNC: 17 U/L
AVERAGE GLUCOSE: NORMAL
BILIRUB SERPL-MCNC: 0.4 MG/DL (ref 0.1–1.4)
BUN BLDV-MCNC: 17 MG/DL
CALCIUM SERPL-MCNC: 8.7 MG/DL
CHLORIDE BLD-SCNC: 107 MMOL/L
CHOLESTEROL, TOTAL: 104 MG/DL
CHOLESTEROL/HDL RATIO: 3
CO2: 28 MMOL/L
CREAT SERPL-MCNC: 1.16 MG/DL
GFR CALCULATED: 61
GLUCOSE BLD-MCNC: 97 MG/DL
HBA1C MFR BLD: 5.5 %
HDLC SERPL-MCNC: 35 MG/DL (ref 35–70)
LDL CHOLESTEROL CALCULATED: 42 MG/DL (ref 0–160)
NONHDLC SERPL-MCNC: 69 MG/DL
POTASSIUM SERPL-SCNC: 4.1 MMOL/L
SODIUM BLD-SCNC: 142 MMOL/L
TOTAL CK: 80 U/L
TOTAL PROTEIN: 6.1
TRIGL SERPL-MCNC: 197 MG/DL
VLDLC SERPL CALC-MCNC: NORMAL MG/DL

## 2022-03-18 DIAGNOSIS — Z11.59 NEED FOR HEPATITIS C SCREENING TEST: ICD-10-CM

## 2022-03-18 DIAGNOSIS — I25.810 CORONARY ARTERY DISEASE INVOLVING CORONARY BYPASS GRAFT OF NATIVE HEART WITHOUT ANGINA PECTORIS: ICD-10-CM

## 2022-03-18 DIAGNOSIS — R73.01 IMPAIRED FASTING GLUCOSE: ICD-10-CM

## 2022-03-18 DIAGNOSIS — E03.9 HYPOTHYROIDISM, UNSPECIFIED TYPE: Chronic | ICD-10-CM

## 2022-03-18 DIAGNOSIS — I10 ESSENTIAL HYPERTENSION: ICD-10-CM

## 2022-03-18 DIAGNOSIS — Z00.00 ROUTINE GENERAL MEDICAL EXAMINATION AT A HEALTH CARE FACILITY: ICD-10-CM

## 2022-03-18 DIAGNOSIS — Z72.89 OTHER PROBLEMS RELATED TO LIFESTYLE: ICD-10-CM

## 2022-03-22 ENCOUNTER — OFFICE VISIT (OUTPATIENT)
Dept: PRIMARY CARE CLINIC | Age: 77
End: 2022-03-22
Payer: MEDICARE

## 2022-03-22 VITALS
TEMPERATURE: 98.7 F | WEIGHT: 226 LBS | OXYGEN SATURATION: 98 % | HEIGHT: 74 IN | HEART RATE: 68 BPM | SYSTOLIC BLOOD PRESSURE: 124 MMHG | DIASTOLIC BLOOD PRESSURE: 70 MMHG | BODY MASS INDEX: 29 KG/M2

## 2022-03-22 DIAGNOSIS — E03.9 HYPOTHYROIDISM, UNSPECIFIED TYPE: Chronic | ICD-10-CM

## 2022-03-22 DIAGNOSIS — Z95.1 S/P CABG X 3: Chronic | ICD-10-CM

## 2022-03-22 DIAGNOSIS — E78.5 HYPERLIPIDEMIA, UNSPECIFIED HYPERLIPIDEMIA TYPE: Chronic | ICD-10-CM

## 2022-03-22 DIAGNOSIS — I25.810 CORONARY ARTERY DISEASE INVOLVING CORONARY BYPASS GRAFT OF NATIVE HEART WITHOUT ANGINA PECTORIS: Primary | ICD-10-CM

## 2022-03-22 DIAGNOSIS — K50.90 CROHN'S DISEASE IN REMISSION (HCC): ICD-10-CM

## 2022-03-22 DIAGNOSIS — I10 ESSENTIAL HYPERTENSION: ICD-10-CM

## 2022-03-22 PROCEDURE — 99214 OFFICE O/P EST MOD 30 MIN: CPT | Performed by: FAMILY MEDICINE

## 2022-03-22 RX ORDER — ERGOCALCIFEROL 1.25 MG/1
CAPSULE ORAL
Qty: 12 CAPSULE | Refills: 3 | Status: SHIPPED | OUTPATIENT
Start: 2022-03-22

## 2022-03-22 ASSESSMENT — ENCOUNTER SYMPTOMS
ALLERGIC/IMMUNOLOGIC NEGATIVE: 1
RESPIRATORY NEGATIVE: 1
GASTROINTESTINAL NEGATIVE: 1
EYES NEGATIVE: 1

## 2022-03-22 NOTE — PROGRESS NOTES
3/22/22     April Castro    : 1945 Sex: male   Age: 77 y.o. Chief Complaint   Patient presents with    Discuss Labs    Coronary Artery Disease    Other     HAD COVID IN DECEMBER HAS LOSS OF SENSE OF SMELL STILL       Prior to Admission medications    Medication Sig Start Date End Date Taking? Authorizing Provider   vitamin D (ERGOCALCIFEROL) 1.25 MG (78927 UT) CAPS capsule TAKE 1 CAPSULE BY MOUTH ONE TIME PER WEEK 3/22/22  Yes Freida Medina DO   levothyroxine (SYNTHROID) 75 MCG tablet TAKE 1 TABLET DAILY 22  Yes Freida Medina, DO   metroNIDAZOLE (METROCREAM) 0.75 % cream APPLY TO FACE EVERY DAY AT BEDTIME 21  Yes Historical Provider, MD   metoprolol succinate (TOPROL XL) 25 MG extended release tablet TAKE ONE-HALF (1/2) TABLET DAILY 21  Yes Freida Medina DO   rosuvastatin (CRESTOR) 10 MG tablet TAKE 1 TABLET DAILY 21  Yes Freida Medina DO   aspirin 81 MG tablet Take by mouth 11/30/15  Yes Historical Provider, MD          HPI: Cate Ybarra presents today coronary artery disease hypothyroidism hypertension hyperlipidemia Crohn's disease that is in remission. Medically he is well. Medications well-tolerated. Systems review stable. Review of Systems   Constitutional: Negative. HENT: Negative. Eyes: Negative. Respiratory: Negative. Gastrointestinal: Negative. Endocrine: Negative. Genitourinary: Negative. Musculoskeletal: Negative. Skin: Negative. Allergic/Immunologic: Negative. Neurological: Negative. Hematological: Negative. Psychiatric/Behavioral: Negative. Today systems review stable continue current meds care.       Current Outpatient Medications:     vitamin D (ERGOCALCIFEROL) 1.25 MG (47267 UT) CAPS capsule, TAKE 1 CAPSULE BY MOUTH ONE TIME PER WEEK, Disp: 12 capsule, Rfl: 3    levothyroxine (SYNTHROID) 75 MCG tablet, TAKE 1 TABLET DAILY, Disp: 90 tablet, Rfl: 3    metroNIDAZOLE (METROCREAM) 0.75 % cream, APPLY TO FACE EVERY DAY AT BEDTIME, Disp: , Rfl:     metoprolol succinate (TOPROL XL) 25 MG extended release tablet, TAKE ONE-HALF (1/2) TABLET DAILY, Disp: 45 tablet, Rfl: 3    rosuvastatin (CRESTOR) 10 MG tablet, TAKE 1 TABLET DAILY, Disp: 90 tablet, Rfl: 3    aspirin 81 MG tablet, Take by mouth, Disp: , Rfl:     No Known Allergies    Social History     Tobacco Use    Smoking status: Former Smoker     Packs/day: 2.00     Years: 15.00     Pack years: 30.00     Types: Cigarettes     Quit date: 1985     Years since quittin.1    Smokeless tobacco: Former User     Types: Chew     Quit date: 1990   Vaping Use    Vaping Use: Never used   Substance Use Topics    Alcohol use: No     Alcohol/week: 0.0 standard drinks     Comment: Rarely    Drug use: No      Past Surgical History:   Procedure Laterality Date    CARDIAC CATHETERIZATION  9/11/15    Dr Antoine Avalos, multi vessel disease, CTS consult, Dr. Carlos Aldana  2018    Dr. Iron Oreilly- Bypass grafts patent   [address]  2018    CHOLECYSTECTOMY      COLONOSCOPY  10/23/2017    COLONOSCOPY  10/23/2017    CORONARY ARTERY BYPASS GRAFT  9/14/15    x3  Dr Schumacher Cordial History   Problem Relation Age of Onset    Other Mother     Heart Attack Father     Heart Disease Father      Past Medical History:   Diagnosis Date    [facility] ([facility] [address]     CAD (coronary artery disease)     Crohn's disease ([facility] [address]     Crohn's (Sakshi King)    Essential hypertension 2019    Hyperlipidemia 2012    Hypothyroid     Iron deficiency anemia     Kidney stone     Platelets decreased (HCC)     low platelets during the ?     S/P CABG x 3 3/7/2017       Vitals:    22 1041   BP: 124/70   Pulse: 68   Temp: 98.7 °F (37.1 °C)   SpO2: 98%   Weight: 226 lb (102.5 kg)   Height: 6' 2\" (1.88 m)     BP Readings from Last 3 Encounters:   22 124/70   21 134/76   21 MCHC 34.1 09/14/2021    RDW 12.6 09/14/2021     Lab Results   Component Value Date     03/16/2022    K 4.1 03/16/2022     03/16/2022    CO2 28 03/16/2022    BUN 17 03/16/2022    CREATININE 1.16 03/16/2022    GLUCOSE 97 03/16/2022    CALCIUM 8.7 03/16/2022    PROT 6.6 09/04/2019    LABALBU 3.7 03/16/2022    BILITOT 0.4 03/16/2022    ALKPHOS 76 03/16/2022    AST 17 03/16/2022    ALT 13 03/16/2022    LABGLOM 61 03/16/2022    GFRAA >60 09/04/2019        Lab Results   Component Value Date    PSA 0.33 09/14/2021    PSA 0.3 12/04/2019    PSA 0.50 09/15/2014      Lab Results   Component Value Date    LABA1C 5.5 03/16/2022    LABA1C 5.4 03/11/2021    LABA1C 5.4 02/15/2017     No results found for: EAG   Plan Per Assessment:  Emily Mohr was seen today for discuss labs, coronary artery disease and other. Diagnoses and all orders for this visit:    Coronary artery disease involving coronary bypass graft of native heart without angina pectoris  -     CBC with Auto Differential; Future  -     Comprehensive Metabolic Panel; Future  -     Hemoglobin A1C; Future  -     PSA Screening; Future  -     T4; Future  -     TSH; Future  -     Lipid Panel; Future    Essential hypertension  -     CBC with Auto Differential; Future  -     Comprehensive Metabolic Panel; Future  -     Hemoglobin A1C; Future  -     PSA Screening; Future  -     T4; Future  -     TSH; Future  -     Lipid Panel; Future    Hypothyroidism, unspecified type  -     CBC with Auto Differential; Future  -     Comprehensive Metabolic Panel; Future  -     Hemoglobin A1C; Future  -     PSA Screening; Future  -     T4; Future  -     TSH; Future  -     Lipid Panel; Future    Hyperlipidemia, unspecified hyperlipidemia type  -     CBC with Auto Differential; Future  -     Comprehensive Metabolic Panel; Future  -     Hemoglobin A1C; Future  -     PSA Screening; Future  -     T4; Future  -     TSH; Future  -     Lipid Panel;  Future    S/P CABG x 3  -     CBC with Auto Differential; Future  -     Comprehensive Metabolic Panel; Future  -     Hemoglobin A1C; Future  -     PSA Screening; Future  -     T4; Future  -     TSH; Future  -     Lipid Panel; Future    Crohn's disease in remission ([facility] [address]    Other orders  -     vitamin D (ERGOCALCIFEROL) 1.25 MG (43308 UT) CAPS capsule; TAKE 1 CAPSULE BY MOUTH ONE TIME PER WEEK            Return in about 6 months (around 9/22/2022) for PHYS. Orestes Garcia DO    Note was generated with the assistance of voice recognition software. Document was reviewed however may contain grammatical errors.

## 2022-07-05 RX ORDER — ROSUVASTATIN CALCIUM 10 MG/1
TABLET, COATED ORAL
Qty: 90 TABLET | Refills: 3 | Status: SHIPPED | OUTPATIENT
Start: 2022-07-05

## 2022-07-26 RX ORDER — METOPROLOL SUCCINATE 25 MG/1
TABLET, EXTENDED RELEASE ORAL
Qty: 45 TABLET | Refills: 3 | Status: SHIPPED | OUTPATIENT
Start: 2022-07-26

## 2022-09-18 LAB
ALBUMIN SERPL-MCNC: 4 G/DL
ALP BLD-CCNC: 76 U/L
ALT SERPL-CCNC: 13 U/L
ANION GAP SERPL CALCULATED.3IONS-SCNC: NORMAL MMOL/L
AST SERPL-CCNC: 18 U/L
AVERAGE GLUCOSE: NORMAL
BASOPHILS ABSOLUTE: 22 /ΜL
BASOPHILS RELATIVE PERCENT: 0.4 %
BILIRUB SERPL-MCNC: 0.5 MG/DL (ref 0.1–1.4)
BUN BLDV-MCNC: 16 MG/DL
CALCIUM SERPL-MCNC: 9.2 MG/DL
CHLORIDE BLD-SCNC: 109 MMOL/L
CHOLESTEROL, TOTAL: 100 MG/DL
CHOLESTEROL/HDL RATIO: 2.8
CO2: 28 MMOL/L
CREAT SERPL-MCNC: 1.24 MG/DL
EOSINOPHILS ABSOLUTE: 99 /ΜL
EOSINOPHILS RELATIVE PERCENT: 1.8 %
GFR CALCULATED: 61
GLUCOSE BLD-MCNC: 106 MG/DL
HBA1C MFR BLD: 5.6 %
HCT VFR BLD CALC: 44.5 % (ref 41–53)
HDLC SERPL-MCNC: 36 MG/DL (ref 35–70)
HEMOGLOBIN: 14.9 G/DL (ref 13.5–17.5)
LDL CHOLESTEROL CALCULATED: 41 MG/DL (ref 0–160)
LYMPHOCYTES ABSOLUTE: 1623 /ΜL
LYMPHOCYTES RELATIVE PERCENT: 29.5 %
MCH RBC QN AUTO: 31.7 PG
MCHC RBC AUTO-ENTMCNC: 33.5 G/DL
MCV RBC AUTO: 94.7 FL
MONOCYTES ABSOLUTE: 550 /ΜL
MONOCYTES RELATIVE PERCENT: 10 %
NEUTROPHILS ABSOLUTE: 3207 /ΜL
NEUTROPHILS RELATIVE PERCENT: 58.3 %
NONHDLC SERPL-MCNC: 64 MG/DL
PDW BLD-RTO: 12.8 %
PLATELET # BLD: 183 K/ΜL
PMV BLD AUTO: 10 FL
POTASSIUM SERPL-SCNC: 4.8 MMOL/L
PROSTATE SPECIFIC ANTIGEN: 0.43 NG/ML
RBC # BLD: 4.7 10^6/ΜL
SODIUM BLD-SCNC: 143 MMOL/L
T4 TOTAL: 5.6
TOTAL PROTEIN: 6.5
TRIGL SERPL-MCNC: 149 MG/DL
TSH SERPL DL<=0.05 MIU/L-ACNC: 1.74 UIU/ML
VLDLC SERPL CALC-MCNC: NORMAL MG/DL
WBC # BLD: 5.5 10^3/ML

## 2022-09-20 DIAGNOSIS — I25.810 CORONARY ARTERY DISEASE INVOLVING CORONARY BYPASS GRAFT OF NATIVE HEART WITHOUT ANGINA PECTORIS: ICD-10-CM

## 2022-09-20 DIAGNOSIS — Z95.1 S/P CABG X 3: Chronic | ICD-10-CM

## 2022-09-20 DIAGNOSIS — E03.9 HYPOTHYROIDISM, UNSPECIFIED TYPE: Chronic | ICD-10-CM

## 2022-09-20 DIAGNOSIS — I10 ESSENTIAL HYPERTENSION: ICD-10-CM

## 2022-09-20 DIAGNOSIS — E78.5 HYPERLIPIDEMIA, UNSPECIFIED HYPERLIPIDEMIA TYPE: Chronic | ICD-10-CM

## 2022-09-22 ENCOUNTER — OFFICE VISIT (OUTPATIENT)
Dept: PRIMARY CARE CLINIC | Age: 77
End: 2022-09-22
Payer: MEDICARE

## 2022-09-22 VITALS
HEIGHT: 74 IN | OXYGEN SATURATION: 96 % | HEART RATE: 71 BPM | DIASTOLIC BLOOD PRESSURE: 68 MMHG | WEIGHT: 227 LBS | BODY MASS INDEX: 29.13 KG/M2 | SYSTOLIC BLOOD PRESSURE: 122 MMHG

## 2022-09-22 DIAGNOSIS — I10 ESSENTIAL HYPERTENSION: ICD-10-CM

## 2022-09-22 DIAGNOSIS — U09.9 POST-COVID CHRONIC FATIGUE: ICD-10-CM

## 2022-09-22 DIAGNOSIS — Z00.00 ANNUAL PHYSICAL EXAM: ICD-10-CM

## 2022-09-22 DIAGNOSIS — E03.9 HYPOTHYROIDISM, UNSPECIFIED TYPE: Chronic | ICD-10-CM

## 2022-09-22 DIAGNOSIS — G93.32 POST-COVID CHRONIC FATIGUE: ICD-10-CM

## 2022-09-22 DIAGNOSIS — Z95.1 S/P CABG X 3: Primary | ICD-10-CM

## 2022-09-22 DIAGNOSIS — E78.5 HYPERLIPIDEMIA, UNSPECIFIED HYPERLIPIDEMIA TYPE: Chronic | ICD-10-CM

## 2022-09-22 LAB
BILIRUBIN, POC: NORMAL
BLOOD URINE, POC: NORMAL
CLARITY, POC: CLEAR
COLOR, POC: YELLOW
GLUCOSE URINE, POC: NORMAL
KETONES, POC: NORMAL
LEUKOCYTE EST, POC: NORMAL
NITRITE, POC: NORMAL
PH, POC: 6
PROTEIN, POC: NORMAL
SPECIFIC GRAVITY, POC: 1.02
UROBILINOGEN, POC: 0.2

## 2022-09-22 PROCEDURE — 93000 ELECTROCARDIOGRAM COMPLETE: CPT | Performed by: FAMILY MEDICINE

## 2022-09-22 PROCEDURE — 1123F ACP DISCUSS/DSCN MKR DOCD: CPT | Performed by: FAMILY MEDICINE

## 2022-09-22 PROCEDURE — G0439 PPPS, SUBSEQ VISIT: HCPCS | Performed by: FAMILY MEDICINE

## 2022-09-22 PROCEDURE — 81002 URINALYSIS NONAUTO W/O SCOPE: CPT | Performed by: FAMILY MEDICINE

## 2022-09-22 SDOH — ECONOMIC STABILITY: FOOD INSECURITY: WITHIN THE PAST 12 MONTHS, YOU WORRIED THAT YOUR FOOD WOULD RUN OUT BEFORE YOU GOT MONEY TO BUY MORE.: NEVER TRUE

## 2022-09-22 SDOH — ECONOMIC STABILITY: FOOD INSECURITY: WITHIN THE PAST 12 MONTHS, THE FOOD YOU BOUGHT JUST DIDN'T LAST AND YOU DIDN'T HAVE MONEY TO GET MORE.: NEVER TRUE

## 2022-09-22 ASSESSMENT — PATIENT HEALTH QUESTIONNAIRE - PHQ9
2. FEELING DOWN, DEPRESSED OR HOPELESS: 0
SUM OF ALL RESPONSES TO PHQ QUESTIONS 1-9: 0
SUM OF ALL RESPONSES TO PHQ9 QUESTIONS 1 & 2: 0
1. LITTLE INTEREST OR PLEASURE IN DOING THINGS: 0
SUM OF ALL RESPONSES TO PHQ QUESTIONS 1-9: 0

## 2022-09-22 ASSESSMENT — SOCIAL DETERMINANTS OF HEALTH (SDOH): HOW HARD IS IT FOR YOU TO PAY FOR THE VERY BASICS LIKE FOOD, HOUSING, MEDICAL CARE, AND HEATING?: NOT HARD AT ALL

## 2022-09-22 NOTE — PROGRESS NOTES
22     Odalis Dutton    : 1945 Sex: male   Age: 69 y.o. Chief Complaint   Patient presents with    Annual Exam    Discuss Labs       Prior to Admission medications    Medication Sig Start Date End Date Taking? Authorizing Provider   metoprolol succinate (TOPROL XL) 25 MG extended release tablet TAKE ONE-HALF (1/2) TABLET DAILY 22  Yes Erin Medina DO   rosuvastatin (CRESTOR) 10 MG tablet TAKE 1 TABLET DAILY 22  Yes Erin Medina DO   vitamin D (ERGOCALCIFEROL) 1.25 MG (27616 UT) CAPS capsule TAKE 1 CAPSULE BY MOUTH ONE TIME PER WEEK 3/22/22  Yes Erin Medina DO   levothyroxine (SYNTHROID) 75 MCG tablet TAKE 1 TABLET DAILY 22  Yes Erin Medina DO   metroNIDAZOLE (METROCREAM) 0.75 % cream APPLY TO FACE EVERY DAY AT BEDTIME 21  Yes Historical Provider, MD   aspirin 81 MG tablet Take by mouth 11/30/15  Yes Historical Provider, MD          HPI: Patient evaluated this morning status post CABG hypertensive disease hyperlipidemia hypothyroidism some post COVID fatigue over the past year. Today and for complete medical exam and seems to be managing fairly well. Labs reviewed with him and are all stable. EKG is stable. Stool occult was negative today. Colonoscopy completed in 2017 and normal findings. Prostate firm no palpable nodules and PSA is normal.          Review of Systems   Constitutional:  Positive for fatigue. HENT: Negative. Eyes: Negative. Respiratory: Negative. Gastrointestinal: Negative. Endocrine: Negative. Genitourinary: Negative. Musculoskeletal: Negative. Skin: Negative. Allergic/Immunologic: Negative. Neurological: Negative. Hematological: Negative. Psychiatric/Behavioral: Negative. Today systems review is overall stable aside from the complaints of fatigue but managing well.         Current Outpatient Medications:     metoprolol succinate (TOPROL XL) 25 MG extended release tablet, TAKE ONE-HALF (1/2) TABLET DAILY, Disp: 45 tablet, Rfl: 3    rosuvastatin (CRESTOR) 10 MG tablet, TAKE 1 TABLET DAILY, Disp: 90 tablet, Rfl: 3    vitamin D (ERGOCALCIFEROL) 1.25 MG (23899 UT) CAPS capsule, TAKE 1 CAPSULE BY MOUTH ONE TIME PER WEEK, Disp: 12 capsule, Rfl: 3    levothyroxine (SYNTHROID) 75 MCG tablet, TAKE 1 TABLET DAILY, Disp: 90 tablet, Rfl: 3    metroNIDAZOLE (METROCREAM) 0.75 % cream, APPLY TO FACE EVERY DAY AT BEDTIME, Disp: , Rfl:     aspirin 81 MG tablet, Take by mouth, Disp: , Rfl:     No Known Allergies    Social History     Tobacco Use    Smoking status: Former     Packs/day: 2.00     Years: 15.00     Pack years: 30.00     Types: Cigarettes     Quit date: 1985     Years since quittin.6    Smokeless tobacco: Former     Types: Chew     Quit date: 1990   Vaping Use    Vaping Use: Never used   Substance Use Topics    Alcohol use: No     Alcohol/week: 0.0 standard drinks     Comment: Rarely    Drug use: No      Past Surgical History:   Procedure Laterality Date    CARDIAC CATHETERIZATION  9/11/15    Dr Micheline Mahmood, multi vessel disease, CTS consult, Dr. Gracie Mccann  2018    Dr. Naye Paredes- Bypass grafts patent    CARDIAC CATHETERIZATION  2018    CHOLECYSTECTOMY      COLONOSCOPY  10/23/2017    COLONOSCOPY  10/23/2017    CORONARY ARTERY BYPASS GRAFT  9/14/15    x3  Dr Walter Gomez History   Problem Relation Age of Onset    Other Mother     Heart Attack Father     Heart Disease Father      Past Medical History:   Diagnosis Date    Atrial fibrillation ([facility] [address]     CAD (coronary artery disease)     Crohn's disease ([facility] [address]     [facility]    Essential hypertension 2019    Hyperlipidemia 2012    Hypothyroid     Iron deficiency anemia     Kidney stone     Platelets decreased (HCC)     low platelets during the ?     S/P CABG x 3 3/7/2017       Vitals:    22 0706   BP: 122/68   Pulse: 71   SpO2: 96%   Weight: 227 lb (103 kg) Height: 6' 2\" (1.88 m)     BP Readings from Last 3 Encounters:   09/22/22 122/68   03/22/22 124/70   11/02/21 134/76        Physical Exam  Vitals and nursing note reviewed. Constitutional:       Appearance: He is well-developed. HENT:      Head: Normocephalic. Right Ear: External ear normal.      Left Ear: External ear normal.      Nose: Nose normal.   Eyes:      Conjunctiva/sclera: Conjunctivae normal.      Pupils: Pupils are equal, round, and reactive to light. Cardiovascular:      Rate and Rhythm: Normal rate. Pulmonary:      Breath sounds: Normal breath sounds. Abdominal:      General: Bowel sounds are normal.      Palpations: Abdomen is soft. Musculoskeletal:         General: Normal range of motion. Cervical back: Normal range of motion and neck supple. Skin:     General: Skin is warm and dry. Neurological:      Mental Status: He is alert and oriented to person, place, and time. Psychiatric:         Behavior: Behavior normal.      Today's vitals physical examination stable. Heart regular lungs are clear. Stool occult was negative as noted. Prostate firm no palpable nodules. Maintain current care and follow-up with me 6 months sooner if problems. Plan Per Assessment:  Michaela Sommers was seen today for annual exam and discuss labs. Diagnoses and all orders for this visit:    S/P CABG x 3  -     EKG 12 lead; Future  -     EKG 12 lead    Annual physical exam  -     POCT Urinalysis no Micro    Essential hypertension  -     Comprehensive Metabolic Panel; Future  -     Lipid Panel; Future  -     CK; Future    Hyperlipidemia, unspecified hyperlipidemia type  -     Comprehensive Metabolic Panel; Future  -     Lipid Panel; Future  -     CK; Future    Hypothyroidism, unspecified type    Post-COVID chronic fatigue          Return in about 6 months (around 3/22/2023). Aston Oneil DO    Note was generated with the assistance of voice recognition software.   Document was reviewed however may contain grammatical errors.

## 2022-10-22 PROBLEM — Z00.00 ANNUAL PHYSICAL EXAM: Status: RESOLVED | Noted: 2022-09-22 | Resolved: 2022-10-22

## 2023-02-17 RX ORDER — ERGOCALCIFEROL 1.25 MG/1
CAPSULE ORAL
Qty: 12 CAPSULE | Refills: 3 | Status: SHIPPED | OUTPATIENT
Start: 2023-02-17

## 2023-03-15 DIAGNOSIS — I10 ESSENTIAL HYPERTENSION: ICD-10-CM

## 2023-03-15 DIAGNOSIS — E78.5 HYPERLIPIDEMIA, UNSPECIFIED HYPERLIPIDEMIA TYPE: Chronic | ICD-10-CM

## 2023-03-15 LAB
ALBUMIN SERPL-MCNC: 4.1 G/DL (ref 3.5–5.2)
ALP SERPL-CCNC: 87 U/L (ref 40–129)
ALT SERPL-CCNC: 17 U/L (ref 0–40)
ANION GAP SERPL CALCULATED.3IONS-SCNC: 12 MMOL/L (ref 7–16)
AST SERPL-CCNC: 26 U/L (ref 0–39)
BILIRUB SERPL-MCNC: 0.5 MG/DL (ref 0–1.2)
BUN SERPL-MCNC: 16 MG/DL (ref 6–23)
CALCIUM SERPL-MCNC: 9.1 MG/DL (ref 8.6–10.2)
CHLORIDE SERPL-SCNC: 106 MMOL/L (ref 98–107)
CHOLESTEROL, TOTAL: 103 MG/DL (ref 0–199)
CK SERPL-CCNC: 154 U/L (ref 20–200)
CO2 SERPL-SCNC: 23 MMOL/L (ref 22–29)
CREAT SERPL-MCNC: 1.1 MG/DL (ref 0.7–1.2)
GLUCOSE SERPL-MCNC: 99 MG/DL (ref 74–99)
HDLC SERPL-MCNC: 40 MG/DL
LDLC SERPL CALC-MCNC: 38 MG/DL (ref 0–99)
POTASSIUM SERPL-SCNC: 4.8 MMOL/L (ref 3.5–5)
PROT SERPL-MCNC: 6.6 G/DL (ref 6.4–8.3)
SODIUM SERPL-SCNC: 141 MMOL/L (ref 132–146)
TRIGL SERPL-MCNC: 127 MG/DL (ref 0–149)
VLDLC SERPL CALC-MCNC: 25 MG/DL

## 2023-03-22 ENCOUNTER — OFFICE VISIT (OUTPATIENT)
Dept: PRIMARY CARE CLINIC | Age: 78
End: 2023-03-22
Payer: MEDICARE

## 2023-03-22 VITALS
HEART RATE: 65 BPM | WEIGHT: 219 LBS | OXYGEN SATURATION: 98 % | HEIGHT: 74 IN | SYSTOLIC BLOOD PRESSURE: 120 MMHG | BODY MASS INDEX: 28.11 KG/M2 | DIASTOLIC BLOOD PRESSURE: 66 MMHG | TEMPERATURE: 97.1 F

## 2023-03-22 DIAGNOSIS — Z95.1 S/P CABG X 3: Chronic | ICD-10-CM

## 2023-03-22 DIAGNOSIS — I10 ESSENTIAL HYPERTENSION: ICD-10-CM

## 2023-03-22 DIAGNOSIS — E03.9 HYPOTHYROIDISM, UNSPECIFIED TYPE: Primary | Chronic | ICD-10-CM

## 2023-03-22 DIAGNOSIS — R79.89 LOW TESTOSTERONE: ICD-10-CM

## 2023-03-22 DIAGNOSIS — E78.5 HYPERLIPIDEMIA, UNSPECIFIED HYPERLIPIDEMIA TYPE: Chronic | ICD-10-CM

## 2023-03-22 LAB — TESTOST SERPL-MCNC: 359.1 NG/DL

## 2023-03-22 PROCEDURE — 3078F DIAST BP <80 MM HG: CPT | Performed by: FAMILY MEDICINE

## 2023-03-22 PROCEDURE — G8417 CALC BMI ABV UP PARAM F/U: HCPCS | Performed by: FAMILY MEDICINE

## 2023-03-22 PROCEDURE — 1123F ACP DISCUSS/DSCN MKR DOCD: CPT | Performed by: FAMILY MEDICINE

## 2023-03-22 PROCEDURE — 1036F TOBACCO NON-USER: CPT | Performed by: FAMILY MEDICINE

## 2023-03-22 PROCEDURE — G8427 DOCREV CUR MEDS BY ELIG CLIN: HCPCS | Performed by: FAMILY MEDICINE

## 2023-03-22 PROCEDURE — 99214 OFFICE O/P EST MOD 30 MIN: CPT | Performed by: FAMILY MEDICINE

## 2023-03-22 PROCEDURE — 3074F SYST BP LT 130 MM HG: CPT | Performed by: FAMILY MEDICINE

## 2023-03-22 PROCEDURE — G8484 FLU IMMUNIZE NO ADMIN: HCPCS | Performed by: FAMILY MEDICINE

## 2023-03-22 RX ORDER — LEVOTHYROXINE SODIUM 0.07 MG/1
TABLET ORAL
Qty: 90 TABLET | Refills: 3 | Status: SHIPPED | OUTPATIENT
Start: 2023-03-22

## 2023-03-22 RX ORDER — METOPROLOL SUCCINATE 25 MG/1
12.5 TABLET, EXTENDED RELEASE ORAL DAILY
Qty: 45 TABLET | Refills: 3 | Status: SHIPPED | OUTPATIENT
Start: 2023-03-22

## 2023-03-22 RX ORDER — ROSUVASTATIN CALCIUM 10 MG/1
10 TABLET, COATED ORAL DAILY
Qty: 90 TABLET | Refills: 3 | Status: SHIPPED | OUTPATIENT
Start: 2023-03-22

## 2023-03-22 SDOH — ECONOMIC STABILITY: INCOME INSECURITY: HOW HARD IS IT FOR YOU TO PAY FOR THE VERY BASICS LIKE FOOD, HOUSING, MEDICAL CARE, AND HEATING?: PATIENT DECLINED

## 2023-03-22 SDOH — ECONOMIC STABILITY: HOUSING INSECURITY
IN THE LAST 12 MONTHS, WAS THERE A TIME WHEN YOU DID NOT HAVE A STEADY PLACE TO SLEEP OR SLEPT IN A SHELTER (INCLUDING NOW)?: PATIENT REFUSED

## 2023-03-22 SDOH — ECONOMIC STABILITY: FOOD INSECURITY: WITHIN THE PAST 12 MONTHS, YOU WORRIED THAT YOUR FOOD WOULD RUN OUT BEFORE YOU GOT MONEY TO BUY MORE.: PATIENT DECLINED

## 2023-03-22 SDOH — ECONOMIC STABILITY: FOOD INSECURITY: WITHIN THE PAST 12 MONTHS, THE FOOD YOU BOUGHT JUST DIDN'T LAST AND YOU DIDN'T HAVE MONEY TO GET MORE.: PATIENT DECLINED

## 2023-03-22 ASSESSMENT — ENCOUNTER SYMPTOMS
ALLERGIC/IMMUNOLOGIC NEGATIVE: 1
GASTROINTESTINAL NEGATIVE: 1
EYES NEGATIVE: 1
RESPIRATORY NEGATIVE: 1

## 2023-03-22 ASSESSMENT — PATIENT HEALTH QUESTIONNAIRE - PHQ9: DEPRESSION UNABLE TO ASSESS: URGENT/EMERGENT SITUATION

## 2023-03-22 NOTE — PROGRESS NOTES
03/22/23 0951   BP: 120/66   Pulse: 65   Temp: 97.1 °F (36.2 °C)   SpO2: 98%   Weight: 219 lb (99.3 kg)   Height: 6' 2\" (1.88 m)     BP Readings from Last 3 Encounters:   03/22/23 120/66   09/22/22 122/68   03/22/22 124/70        Physical Exam  Vitals and nursing note reviewed. Constitutional:       Appearance: He is well-developed. HENT:      Head: Normocephalic. Right Ear: External ear normal.      Left Ear: External ear normal.      Nose: Nose normal.   Eyes:      Conjunctiva/sclera: Conjunctivae normal.      Pupils: Pupils are equal, round, and reactive to light. Cardiovascular:      Rate and Rhythm: Normal rate. Pulmonary:      Breath sounds: Normal breath sounds. Abdominal:      General: Bowel sounds are normal.      Palpations: Abdomen is soft. Musculoskeletal:         General: Normal range of motion. Cervical back: Normal range of motion and neck supple. Skin:     General: Skin is warm and dry. Neurological:      Mental Status: He is alert and oriented to person, place, and time. Psychiatric:         Behavior: Behavior normal.      Today's vitals physical examination stable. Heart is controlled lungs are clear. I will sit tight with current meds and care. Reassessment next 3 months on his weight and labs again in 6 months.   Lab Results   Component Value Date    TSH 1.74 09/17/2022    TSH 1.09 09/14/2021    S3XAFBU 5.6 09/17/2022    Q5YKFGR 5.7 09/14/2021    FT3 2.4 08/03/2011     Lab Results   Component Value Date    CHOL 103 03/15/2023    CHOL 100 09/17/2022     Lab Results   Component Value Date    TRIG 127 03/15/2023    TRIG 149 09/17/2022     Lab Results   Component Value Date    HDL 40 03/15/2023    HDL 36 09/17/2022     No results found for: [facility]  Lab Results   Component Value Date    LABVLDL 25 03/15/2023    LABVLDL 29 09/04/2019    VLDL 2.7 03/11/2021     Lab Results   Component Value Date    CHOLHDLRATIO 2.8 09/17/2022    CHOLHDLRATIO 3.0 03/16/2022     Lab

## 2023-08-03 ENCOUNTER — OFFICE VISIT (OUTPATIENT)
Dept: PRIMARY CARE CLINIC | Age: 78
End: 2023-08-03
Payer: MEDICARE

## 2023-08-03 VITALS
TEMPERATURE: 97.7 F | BODY MASS INDEX: 27.35 KG/M2 | DIASTOLIC BLOOD PRESSURE: 68 MMHG | OXYGEN SATURATION: 97 % | WEIGHT: 213 LBS | SYSTOLIC BLOOD PRESSURE: 110 MMHG | HEART RATE: 68 BPM

## 2023-08-03 DIAGNOSIS — R79.89 LOW TESTOSTERONE: ICD-10-CM

## 2023-08-03 DIAGNOSIS — E78.5 HYPERLIPIDEMIA, UNSPECIFIED HYPERLIPIDEMIA TYPE: Chronic | ICD-10-CM

## 2023-08-03 DIAGNOSIS — K50.90 CROHN'S DISEASE IN REMISSION (HCC): ICD-10-CM

## 2023-08-03 DIAGNOSIS — I25.810 CORONARY ARTERY DISEASE INVOLVING CORONARY BYPASS GRAFT OF NATIVE HEART WITHOUT ANGINA PECTORIS: Primary | ICD-10-CM

## 2023-08-03 DIAGNOSIS — Z95.1 S/P CABG X 3: Chronic | ICD-10-CM

## 2023-08-03 DIAGNOSIS — E03.9 HYPOTHYROIDISM, UNSPECIFIED TYPE: Chronic | ICD-10-CM

## 2023-08-03 PROCEDURE — 1123F ACP DISCUSS/DSCN MKR DOCD: CPT | Performed by: FAMILY MEDICINE

## 2023-08-03 PROCEDURE — 3074F SYST BP LT 130 MM HG: CPT | Performed by: FAMILY MEDICINE

## 2023-08-03 PROCEDURE — 3078F DIAST BP <80 MM HG: CPT | Performed by: FAMILY MEDICINE

## 2023-08-03 PROCEDURE — 99214 OFFICE O/P EST MOD 30 MIN: CPT | Performed by: FAMILY MEDICINE

## 2023-08-03 ASSESSMENT — PATIENT HEALTH QUESTIONNAIRE - PHQ9
SUM OF ALL RESPONSES TO PHQ QUESTIONS 1-9: 0
SUM OF ALL RESPONSES TO PHQ QUESTIONS 1-9: 0
2. FEELING DOWN, DEPRESSED OR HOPELESS: 0
1. LITTLE INTEREST OR PLEASURE IN DOING THINGS: 0
SUM OF ALL RESPONSES TO PHQ9 QUESTIONS 1 & 2: 0
SUM OF ALL RESPONSES TO PHQ QUESTIONS 1-9: 0
SUM OF ALL RESPONSES TO PHQ QUESTIONS 1-9: 0

## 2023-08-03 ASSESSMENT — ENCOUNTER SYMPTOMS
RESPIRATORY NEGATIVE: 1
ALLERGIC/IMMUNOLOGIC NEGATIVE: 1
EYES NEGATIVE: 1
GASTROINTESTINAL NEGATIVE: 1

## 2023-08-03 NOTE — PROGRESS NOTES
file.      Zoey Bailey DO    Note was generated with the assistance of voice recognition software. Document was reviewed however may contain grammatical errors.

## 2023-11-01 LAB
ALBUMIN SERPL-MCNC: 4 G/DL
ALP BLD-CCNC: 81 U/L
ALT SERPL-CCNC: 14 U/L
ANION GAP SERPL CALCULATED.3IONS-SCNC: NORMAL MMOL/L
AST SERPL-CCNC: 21 U/L
BASOPHILS ABSOLUTE: 18 /ΜL
BASOPHILS RELATIVE PERCENT: 0.3 %
BILIRUB SERPL-MCNC: 0.5 MG/DL (ref 0.1–1.4)
BUN BLDV-MCNC: 16 MG/DL
CALCIUM SERPL-MCNC: 9 MG/DL
CHLORIDE BLD-SCNC: 108 MMOL/L
CHOLESTEROL, TOTAL: 114 MG/DL
CHOLESTEROL/HDL RATIO: 2.5
CO2: 28 MMOL/L
CREAT SERPL-MCNC: 1.05 MG/DL
EGFR: 73
EOSINOPHILS ABSOLUTE: 92 /ΜL
EOSINOPHILS RELATIVE PERCENT: 1.5 %
GLUCOSE BLD-MCNC: 101 MG/DL
HCT VFR BLD CALC: 46.2 % (ref 41–53)
HDLC SERPL-MCNC: 46 MG/DL (ref 35–70)
HEMOGLOBIN: 15.5 G/DL (ref 13.5–17.5)
LDL CHOLESTEROL CALCULATED: 48 MG/DL (ref 0–160)
LYMPHOCYTES ABSOLUTE: 1598 /ΜL
LYMPHOCYTES RELATIVE PERCENT: 26.2 %
MCH RBC QN AUTO: 32.2 PG
MCHC RBC AUTO-ENTMCNC: 33.5 G/DL
MCV RBC AUTO: 96 FL
MONOCYTES ABSOLUTE: 439 /ΜL
MONOCYTES RELATIVE PERCENT: 7.2 %
NEUTROPHILS ABSOLUTE: 3953 /ΜL
NEUTROPHILS RELATIVE PERCENT: 64.8 %
NONHDLC SERPL-MCNC: 68 MG/DL
PDW BLD-RTO: 12.3 %
PLATELET # BLD: 164 K/ΜL
PMV BLD AUTO: 10.3 FL
POTASSIUM SERPL-SCNC: 4.6 MMOL/L
RBC # BLD: 4.81 10^6/ΜL
SODIUM BLD-SCNC: 142 MMOL/L
T4 TOTAL: 6.2
TESTOSTERONE TOTAL: 504
TOTAL PROTEIN: 6.5
TRIGL SERPL-MCNC: 117 MG/DL
TSH SERPL DL<=0.05 MIU/L-ACNC: 2.15 UIU/ML
VLDLC SERPL CALC-MCNC: NORMAL MG/DL
WBC # BLD: 6.1 10^3/ML

## 2023-11-02 DIAGNOSIS — R79.89 LOW TESTOSTERONE: ICD-10-CM

## 2023-11-02 DIAGNOSIS — E03.9 HYPOTHYROIDISM, UNSPECIFIED TYPE: Chronic | ICD-10-CM

## 2023-11-02 DIAGNOSIS — I25.810 CORONARY ARTERY DISEASE INVOLVING CORONARY BYPASS GRAFT OF NATIVE HEART WITHOUT ANGINA PECTORIS: ICD-10-CM

## 2023-11-02 DIAGNOSIS — Z95.1 S/P CABG X 3: Chronic | ICD-10-CM

## 2023-11-02 DIAGNOSIS — E78.5 HYPERLIPIDEMIA, UNSPECIFIED HYPERLIPIDEMIA TYPE: Chronic | ICD-10-CM

## 2023-11-03 ENCOUNTER — OFFICE VISIT (OUTPATIENT)
Dept: PRIMARY CARE CLINIC | Age: 78
End: 2023-11-03
Payer: MEDICARE

## 2023-11-03 ENCOUNTER — OFFICE VISIT (OUTPATIENT)
Dept: PRIMARY CARE CLINIC | Age: 78
End: 2023-11-03

## 2023-11-03 VITALS
BODY MASS INDEX: 28.36 KG/M2 | WEIGHT: 221 LBS | HEART RATE: 67 BPM | OXYGEN SATURATION: 98 % | HEIGHT: 74 IN | SYSTOLIC BLOOD PRESSURE: 112 MMHG | TEMPERATURE: 98 F | DIASTOLIC BLOOD PRESSURE: 70 MMHG

## 2023-11-03 DIAGNOSIS — I25.810 CORONARY ARTERY DISEASE INVOLVING CORONARY BYPASS GRAFT OF NATIVE HEART WITHOUT ANGINA PECTORIS: ICD-10-CM

## 2023-11-03 DIAGNOSIS — Z95.1 S/P CABG X 3: Chronic | ICD-10-CM

## 2023-11-03 DIAGNOSIS — R73.01 IMPAIRED FASTING GLUCOSE: ICD-10-CM

## 2023-11-03 DIAGNOSIS — E03.9 HYPOTHYROIDISM, UNSPECIFIED TYPE: ICD-10-CM

## 2023-11-03 DIAGNOSIS — Z23 NEED FOR PROPHYLACTIC VACCINATION AGAINST STREPTOCOCCUS PNEUMONIAE (PNEUMOCOCCUS): ICD-10-CM

## 2023-11-03 DIAGNOSIS — Z00.00 MEDICARE ANNUAL WELLNESS VISIT, SUBSEQUENT: Primary | ICD-10-CM

## 2023-11-03 DIAGNOSIS — I10 ESSENTIAL HYPERTENSION: ICD-10-CM

## 2023-11-03 DIAGNOSIS — E78.5 HYPERLIPIDEMIA, UNSPECIFIED HYPERLIPIDEMIA TYPE: ICD-10-CM

## 2023-11-03 DIAGNOSIS — R05.3 CHRONIC COUGH: Primary | ICD-10-CM

## 2023-11-03 PROCEDURE — 3078F DIAST BP <80 MM HG: CPT | Performed by: FAMILY MEDICINE

## 2023-11-03 PROCEDURE — 3074F SYST BP LT 130 MM HG: CPT | Performed by: FAMILY MEDICINE

## 2023-11-03 PROCEDURE — 1123F ACP DISCUSS/DSCN MKR DOCD: CPT | Performed by: FAMILY MEDICINE

## 2023-11-03 PROCEDURE — 99214 OFFICE O/P EST MOD 30 MIN: CPT | Performed by: FAMILY MEDICINE

## 2023-11-03 RX ORDER — AZITHROMYCIN 250 MG/1
TABLET, FILM COATED ORAL
Qty: 1 PACKET | Refills: 0 | Status: SHIPPED | OUTPATIENT
Start: 2023-11-03

## 2023-11-03 RX ORDER — BENZONATATE 200 MG/1
200 CAPSULE ORAL 3 TIMES DAILY PRN
Qty: 30 CAPSULE | Refills: 0 | Status: SHIPPED | OUTPATIENT
Start: 2023-11-03 | End: 2023-11-13

## 2023-11-03 RX ORDER — PREDNISONE 5 MG/1
TABLET ORAL
Qty: 30 TABLET | Refills: 0 | Status: SHIPPED | OUTPATIENT
Start: 2023-11-03

## 2023-11-03 ASSESSMENT — PATIENT HEALTH QUESTIONNAIRE - PHQ9
SUM OF ALL RESPONSES TO PHQ QUESTIONS 1-9: 0
2. FEELING DOWN, DEPRESSED OR HOPELESS: 0
SUM OF ALL RESPONSES TO PHQ QUESTIONS 1-9: 0
SUM OF ALL RESPONSES TO PHQ QUESTIONS 1-9: 0
1. LITTLE INTEREST OR PLEASURE IN DOING THINGS: 0
SUM OF ALL RESPONSES TO PHQ9 QUESTIONS 1 & 2: 0
SUM OF ALL RESPONSES TO PHQ QUESTIONS 1-9: 0

## 2023-11-03 ASSESSMENT — ENCOUNTER SYMPTOMS
COUGH: 1
EYES NEGATIVE: 1
ALLERGIC/IMMUNOLOGIC NEGATIVE: 1
GASTROINTESTINAL NEGATIVE: 1

## 2023-11-03 ASSESSMENT — LIFESTYLE VARIABLES
HOW MANY STANDARD DRINKS CONTAINING ALCOHOL DO YOU HAVE ON A TYPICAL DAY: PATIENT DOES NOT DRINK
HOW OFTEN DO YOU HAVE A DRINK CONTAINING ALCOHOL: NEVER

## 2023-11-03 NOTE — PROGRESS NOTES
Component Value Date    TSH 2.15 10/31/2023    TSH 1.74 09/17/2022    G0HVOJK 6.2 10/31/2023    O7NIDEK 5.6 09/17/2022    FT3 2.4 08/03/2011     Lab Results   Component Value Date    CHOL 114 10/31/2023    CHOL 103 03/15/2023     Lab Results   Component Value Date    TRIG 117 10/31/2023    TRIG 127 03/15/2023     Lab Results   Component Value Date    HDL 46 10/31/2023    HDL 40 03/15/2023     Lab Results   Component Value Date    LDLCALC 48 10/31/2023    LDLCALC 38 03/15/2023     Lab Results   Component Value Date    LABVLDL 25 03/15/2023    LABVLDL 29 09/04/2019    VLDL 2.7 03/11/2021     Lab Results   Component Value Date    CHOLHDLRATIO 2.5 10/31/2023    CHOLHDLRATIO 2.8 09/17/2022     Lab Results   Component Value Date    WBC 6.1 10/31/2023    HGB 15.5 10/31/2023    HCT 46.2 10/31/2023    MCV 96 10/31/2023     10/31/2023    LYMPHOPCT 26.2 10/31/2023    RBC 4.81 10/31/2023    MCH 32.2 10/31/2023    MCHC 33.5 10/31/2023    RDW 12.3 10/31/2023     Lab Results   Component Value Date     10/31/2023    K 4.6 10/31/2023     10/31/2023    CO2 28 10/31/2023    BUN 16 10/31/2023    CREATININE 1.05 10/31/2023    GLUCOSE 101 10/31/2023    CALCIUM 9.0 10/31/2023    PROT 6.6 03/15/2023    LABALBU 4.0 10/31/2023    BILITOT 0.5 10/31/2023    ALKPHOS 81 10/31/2023    AST 21 10/31/2023    ALT 14 10/31/2023    LABGLOM 73 10/31/2023    GFRAA >60 09/04/2019        Lab Results   Component Value Date    PSA 0.43 09/17/2022    PSA 0.33 09/14/2021    PSA 0.3 12/04/2019      Lab Results   Component Value Date    LABA1C 5.6 09/17/2022    LABA1C 5.5 03/16/2022    LABA1C 5.4 03/11/2021     No results found for: \"EAG\"      Plan Per Assessment:  Belem Wang was seen today for hypothyroidism and cough. Diagnoses and all orders for this visit:    Chronic cough    Essential hypertension  -     Comprehensive Metabolic Panel; Future  -     CK; Future  -     Lipid Panel; Future  -     T4; Future  -     TSH;  Future    Coronary
Impaired
Impaired

## 2023-11-03 NOTE — PROGRESS NOTES
ONE TIME PER WEEK  Theo Medina DO   metroNIDAZOLE (METROCREAM) 0.75 % cream APPLY TO FACE EVERY DAY AT BEDTIME  Provider, MD Marcus   aspirin 81 MG tablet Take by mouth  ProviderMarcus MD CareTeam (Including outside providers/suppliers regularly involved in providing care):   Patient Care Team:  Lo Mckenzie DO as PCP - General (Family Medicine)  Lo Mckenzie DO as PCP - Empaneled Provider  Navid Morris MD as Consulting Physician (Cardiology)  Huyen Ashraf DO as Surgeon (Cardiothoracic Surgery)     Reviewed and updated this visit:  Meds

## 2024-01-15 RX ORDER — ERGOCALCIFEROL 1.25 MG/1
CAPSULE ORAL
Qty: 12 CAPSULE | Refills: 3 | Status: SHIPPED | OUTPATIENT
Start: 2024-01-15

## 2024-01-22 LAB
ALBUMIN SERPL-MCNC: 3.8 G/DL
ALP BLD-CCNC: 72 U/L
ALT SERPL-CCNC: 16 U/L
ANION GAP SERPL CALCULATED.3IONS-SCNC: NORMAL MMOL/L
AST SERPL-CCNC: 20 U/L
BILIRUB SERPL-MCNC: 0.4 MG/DL (ref 0.1–1.4)
BUN BLDV-MCNC: 15 MG/DL
CALCIUM SERPL-MCNC: 8.9 MG/DL
CHLORIDE BLD-SCNC: 109 MMOL/L
CHOLESTEROL, TOTAL: 111 MG/DL
CHOLESTEROL/HDL RATIO: 2.6
CO2: 29 MMOL/L
CREAT SERPL-MCNC: 1.14 MG/DL
EGFR: 66
GLUCOSE BLD-MCNC: 99 MG/DL
HDLC SERPL-MCNC: 43 MG/DL (ref 35–70)
LDL CHOLESTEROL CALCULATED: 44 MG/DL (ref 0–160)
NONHDLC SERPL-MCNC: 68 MG/DL
POTASSIUM SERPL-SCNC: 4 MMOL/L
SODIUM BLD-SCNC: 144 MMOL/L
T4 TOTAL: 5.9
TOTAL CK: 84 U/L
TOTAL PROTEIN: 6.2
TRIGL SERPL-MCNC: 162 MG/DL
TSH SERPL DL<=0.05 MIU/L-ACNC: 1.01 UIU/ML
VLDLC SERPL CALC-MCNC: ABNORMAL MG/DL

## 2024-01-23 ENCOUNTER — OFFICE VISIT (OUTPATIENT)
Dept: PRIMARY CARE CLINIC | Age: 79
End: 2024-01-23
Payer: MEDICARE

## 2024-01-23 VITALS
BODY MASS INDEX: 29.39 KG/M2 | HEIGHT: 74 IN | WEIGHT: 229 LBS | HEART RATE: 70 BPM | TEMPERATURE: 98.2 F | DIASTOLIC BLOOD PRESSURE: 72 MMHG | SYSTOLIC BLOOD PRESSURE: 130 MMHG | OXYGEN SATURATION: 99 %

## 2024-01-23 DIAGNOSIS — I25.810 CORONARY ARTERY DISEASE INVOLVING CORONARY BYPASS GRAFT OF NATIVE HEART WITHOUT ANGINA PECTORIS: ICD-10-CM

## 2024-01-23 DIAGNOSIS — I10 ESSENTIAL HYPERTENSION: Primary | ICD-10-CM

## 2024-01-23 DIAGNOSIS — Z12.5 PROSTATE CANCER SCREENING: ICD-10-CM

## 2024-01-23 DIAGNOSIS — Z95.1 S/P CABG X 3: ICD-10-CM

## 2024-01-23 DIAGNOSIS — H68.012: ICD-10-CM

## 2024-01-23 DIAGNOSIS — E03.9 HYPOTHYROIDISM, UNSPECIFIED TYPE: ICD-10-CM

## 2024-01-23 DIAGNOSIS — E78.5 HYPERLIPIDEMIA, UNSPECIFIED HYPERLIPIDEMIA TYPE: ICD-10-CM

## 2024-01-23 DIAGNOSIS — R73.01 IMPAIRED FASTING GLUCOSE: ICD-10-CM

## 2024-01-23 DIAGNOSIS — R05.1 ACUTE COUGH: ICD-10-CM

## 2024-01-23 PROCEDURE — 3078F DIAST BP <80 MM HG: CPT | Performed by: FAMILY MEDICINE

## 2024-01-23 PROCEDURE — 1123F ACP DISCUSS/DSCN MKR DOCD: CPT | Performed by: FAMILY MEDICINE

## 2024-01-23 PROCEDURE — 3075F SYST BP GE 130 - 139MM HG: CPT | Performed by: FAMILY MEDICINE

## 2024-01-23 PROCEDURE — 99214 OFFICE O/P EST MOD 30 MIN: CPT | Performed by: FAMILY MEDICINE

## 2024-01-23 RX ORDER — AZITHROMYCIN 250 MG/1
TABLET, FILM COATED ORAL
Qty: 1 PACKET | Refills: 0 | Status: SHIPPED | OUTPATIENT
Start: 2024-01-23

## 2024-01-23 RX ORDER — PREDNISONE 5 MG/1
TABLET ORAL
Qty: 30 TABLET | Refills: 0 | Status: SHIPPED | OUTPATIENT
Start: 2024-01-23

## 2024-01-23 RX ORDER — LEVOTHYROXINE SODIUM 0.07 MG/1
TABLET ORAL
Qty: 90 TABLET | Refills: 3 | Status: SHIPPED | OUTPATIENT
Start: 2024-01-23

## 2024-01-23 ASSESSMENT — PATIENT HEALTH QUESTIONNAIRE - PHQ9
SUM OF ALL RESPONSES TO PHQ QUESTIONS 1-9: 0
1. LITTLE INTEREST OR PLEASURE IN DOING THINGS: NOT AT ALL
SUM OF ALL RESPONSES TO PHQ QUESTIONS 1-9: 0
SUM OF ALL RESPONSES TO PHQ9 QUESTIONS 1 & 2: 0
SUM OF ALL RESPONSES TO PHQ9 QUESTIONS 1 & 2: 0
2. FEELING DOWN, DEPRESSED OR HOPELESS: NOT AT ALL
2. FEELING DOWN, DEPRESSED OR HOPELESS: 0
1. LITTLE INTEREST OR PLEASURE IN DOING THINGS: 0

## 2024-01-23 ASSESSMENT — ENCOUNTER SYMPTOMS
ALLERGIC/IMMUNOLOGIC NEGATIVE: 1
GASTROINTESTINAL NEGATIVE: 1
EYES NEGATIVE: 1

## 2024-01-23 NOTE — PROGRESS NOTES
10/23/2017    CORONARY ARTERY BYPASS GRAFT  9/14/15    x3  Dr Jackman    HERNIA REPAIR      TONSILLECTOMY       Family History   Problem Relation Age of Onset    Other Mother     Heart Attack Father     Heart Disease Father      Past Medical History:   Diagnosis Date    Atrial fibrillation (HCC)     CAD (coronary artery disease)     Crohn's disease (HCC)     Crohn's ([place])    Essential hypertension 6/6/2019    Hyperlipidemia 2/22/2012    Hypothyroid     Iron deficiency anemia     Kidney stone     Platelets decreased (HCC)     low platelets during the 1980's?    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   Constitutional:       Appearance: He is well-developed.   HENT:      Head: Normocephalic.      Right Ear: External ear normal.      Left Ear: External ear normal.      Nose: Nose normal.   Eyes:      Conjunctiva/sclera: Conjunctivae normal.      Pupils: Pupils are equal, round, and reactive to light.   Cardiovascular:      Rate and Rhythm: Normal rate.   Pulmonary:      Breath sounds: Normal breath sounds.   Abdominal:      General: Bowel sounds are normal.      Palpations: Abdomen is soft.   Musculoskeletal:         General: Normal range of motion.      Cervical back: Normal range of motion and neck supple.   Skin:     General: Skin is warm and dry.   Neurological:      Mental Status: He is alert and oriented to person, place, and time.   Psychiatric:         Behavior: Behavior normal.        Today's vitals physical examination all stable.  We will maintain present meds and care.  Reassessment 6 months sooner if problems physical at that time.  Labs reviewed and cholesterol 111 HDL 43 LDL 44 triglycerides 162          Plan Per

## 2024-01-24 RX ORDER — ROSUVASTATIN CALCIUM 10 MG/1
10 TABLET, COATED ORAL DAILY
Qty: 90 TABLET | Refills: 3 | Status: SHIPPED | OUTPATIENT
Start: 2024-01-24

## 2024-01-24 RX ORDER — METOPROLOL SUCCINATE 25 MG/1
12.5 TABLET, EXTENDED RELEASE ORAL DAILY
Qty: 45 TABLET | Refills: 3 | Status: SHIPPED | OUTPATIENT
Start: 2024-01-24

## 2024-01-27 DIAGNOSIS — E78.5 HYPERLIPIDEMIA, UNSPECIFIED HYPERLIPIDEMIA TYPE: ICD-10-CM

## 2024-01-27 DIAGNOSIS — E03.9 HYPOTHYROIDISM, UNSPECIFIED TYPE: ICD-10-CM

## 2024-01-27 DIAGNOSIS — I10 ESSENTIAL HYPERTENSION: ICD-10-CM

## 2024-01-27 DIAGNOSIS — I25.810 CORONARY ARTERY DISEASE INVOLVING CORONARY BYPASS GRAFT OF NATIVE HEART WITHOUT ANGINA PECTORIS: ICD-10-CM

## 2024-01-27 DIAGNOSIS — Z95.1 S/P CABG X 3: ICD-10-CM

## 2024-01-27 DIAGNOSIS — R73.01 IMPAIRED FASTING GLUCOSE: ICD-10-CM

## 2024-07-23 ENCOUNTER — OFFICE VISIT (OUTPATIENT)
Dept: PRIMARY CARE CLINIC | Age: 79
End: 2024-07-23
Payer: MEDICARE

## 2024-07-23 VITALS
OXYGEN SATURATION: 98 % | WEIGHT: 230 LBS | SYSTOLIC BLOOD PRESSURE: 130 MMHG | DIASTOLIC BLOOD PRESSURE: 80 MMHG | TEMPERATURE: 97.7 F | BODY MASS INDEX: 29.52 KG/M2 | HEIGHT: 74 IN | HEART RATE: 86 BPM

## 2024-07-23 DIAGNOSIS — I10 ESSENTIAL HYPERTENSION: ICD-10-CM

## 2024-07-23 DIAGNOSIS — Z00.00 ANNUAL PHYSICAL EXAM: Primary | ICD-10-CM

## 2024-07-23 DIAGNOSIS — Z12.5 PROSTATE CANCER SCREENING: ICD-10-CM

## 2024-07-23 DIAGNOSIS — K50.90 CROHN'S DISEASE IN REMISSION (HCC): ICD-10-CM

## 2024-07-23 DIAGNOSIS — Z95.1 S/P CABG X 3: ICD-10-CM

## 2024-07-23 DIAGNOSIS — I21.4 NON-STEMI (NON-ST ELEVATED MYOCARDIAL INFARCTION) (HCC): ICD-10-CM

## 2024-07-23 DIAGNOSIS — E78.5 HYPERLIPIDEMIA, UNSPECIFIED HYPERLIPIDEMIA TYPE: ICD-10-CM

## 2024-07-23 DIAGNOSIS — I25.810 CORONARY ARTERY DISEASE INVOLVING CORONARY BYPASS GRAFT OF NATIVE HEART WITHOUT ANGINA PECTORIS: ICD-10-CM

## 2024-07-23 DIAGNOSIS — R31.21 ASYMPTOMATIC MICROSCOPIC HEMATURIA: ICD-10-CM

## 2024-07-23 DIAGNOSIS — E03.9 HYPOTHYROIDISM, UNSPECIFIED TYPE: ICD-10-CM

## 2024-07-23 DIAGNOSIS — R73.01 IMPAIRED FASTING GLUCOSE: ICD-10-CM

## 2024-07-23 DIAGNOSIS — R79.89 LOW TESTOSTERONE: ICD-10-CM

## 2024-07-23 LAB
ALBUMIN: 4 G/DL
ALP BLD-CCNC: 82 U/L
ALT SERPL-CCNC: 12 U/L
ANION GAP SERPL CALCULATED.3IONS-SCNC: ABNORMAL MMOL/L
AST SERPL-CCNC: 19 U/L
BASOPHILS ABSOLUTE: 19 /ΜL
BASOPHILS RELATIVE PERCENT: 0.3 %
BILIRUB SERPL-MCNC: 0.5 MG/DL (ref 0.1–1.4)
BILIRUBIN, POC: NORMAL
BLOOD URINE, POC: NORMAL
BUN BLDV-MCNC: 15 MG/DL
CALCIUM SERPL-MCNC: 9 MG/DL
CHLORIDE BLD-SCNC: 109 MMOL/L
CHOLESTEROL, TOTAL: 101 MG/DL
CHOLESTEROL/HDL RATIO: 2.6
CLARITY, POC: CLEAR
CO2: 25 MMOL/L
COLOR, POC: YELLOW
CREAT SERPL-MCNC: 1.13 MG/DL
EOSINOPHILS ABSOLUTE: 81 /ΜL
EOSINOPHILS RELATIVE PERCENT: 1.3 %
ESTIMATED AVERAGE GLUCOSE: NORMAL
GFR, ESTIMATED: 67
GLUCOSE BLD-MCNC: 105 MG/DL
GLUCOSE URINE, POC: NORMAL
HBA1C MFR BLD: 5.8 %
HCT VFR BLD CALC: 47 % (ref 41–53)
HDLC SERPL-MCNC: 39 MG/DL (ref 35–70)
HEMOGLOBIN: 15.2 G/DL (ref 13.5–17.5)
KETONES, POC: NORMAL
LDL CHOLESTEROL: 41
LEUKOCYTE EST, POC: NORMAL
LYMPHOCYTES ABSOLUTE: 1680 /ΜL
LYMPHOCYTES RELATIVE PERCENT: 27.1 %
MCH RBC QN AUTO: 31.6 PG
MCHC RBC AUTO-ENTMCNC: 32.3 G/DL
MCV RBC AUTO: 97.7 FL
MONOCYTES ABSOLUTE: 533 /ΜL
MONOCYTES RELATIVE PERCENT: 8.6 %
NEUTROPHILS ABSOLUTE: 3887 /ΜL
NEUTROPHILS RELATIVE PERCENT: 62.7 %
NITRITE, POC: NORMAL
NONHDLC SERPL-MCNC: 62 MG/DL
PDW BLD-RTO: 12.8 %
PH, POC: 5.5
PLATELET # BLD: 169 K/ΜL
PMV BLD AUTO: 10.8 FL
POTASSIUM SERPL-SCNC: 4.8 MMOL/L
PROSTATE SPECIFIC ANTIGEN PERCENT FREE: NORMAL
PROSTATE SPECIFIC ANTIGEN: 0.38 NG/ML
PROTEIN, POC: NORMAL
PSA, FREE: NORMAL
RBC # BLD: 4.81 10^6/ΜL
SODIUM BLD-SCNC: 142 MMOL/L
SPECIFIC GRAVITY, POC: 1.02
T4 TOTAL: 5.7
TOTAL PROTEIN: 6.6 G/DL (ref 6.4–8.2)
TRIGL SERPL-MCNC: 127 MG/DL
TSH SERPL DL<=0.05 MIU/L-ACNC: 1.67 UIU/ML
UROBILINOGEN, POC: 0.2
VLDLC SERPL CALC-MCNC: NORMAL MG/DL
WBC # BLD: 6.2 10^3/ML

## 2024-07-23 PROCEDURE — 93000 ELECTROCARDIOGRAM COMPLETE: CPT | Performed by: FAMILY MEDICINE

## 2024-07-23 PROCEDURE — 99397 PER PM REEVAL EST PAT 65+ YR: CPT | Performed by: FAMILY MEDICINE

## 2024-07-23 PROCEDURE — 81002 URINALYSIS NONAUTO W/O SCOPE: CPT | Performed by: FAMILY MEDICINE

## 2024-07-23 ASSESSMENT — ENCOUNTER SYMPTOMS
GASTROINTESTINAL NEGATIVE: 1
EYES NEGATIVE: 1
RESPIRATORY NEGATIVE: 1
ALLERGIC/IMMUNOLOGIC NEGATIVE: 1

## 2024-07-23 NOTE — PROGRESS NOTES
Musculoskeletal: Negative.    Skin: Negative.    Allergic/Immunologic: Negative.    Neurological: Negative.    Hematological: Negative.    Psychiatric/Behavioral: Negative.                Current Outpatient Medications:     rosuvastatin (CRESTOR) 10 MG tablet, TAKE 1 TABLET BY MOUTH DAILY, Disp: 90 tablet, Rfl: 3    metoprolol succinate (TOPROL XL) 25 MG extended release tablet, TAKE ONE-HALF TABLET BY MOUTH  DAILY, Disp: 45 tablet, Rfl: 3    levothyroxine (SYNTHROID) 75 MCG tablet, TAKE 1 TABLET DAILY, Disp: 90 tablet, Rfl: 3    vitamin D (ERGOCALCIFEROL) 1.25 MG (01137 UT) CAPS capsule, TAKE 1 CAPSULE BY MOUTH ONE TIME PER WEEK, Disp: 12 capsule, Rfl: 3    metroNIDAZOLE (METROCREAM) 0.75 % cream, APPLY TO FACE EVERY DAY AT BEDTIME, Disp: , Rfl:     aspirin 81 MG tablet, Take by mouth, Disp: , Rfl:     No Known Allergies    Social History     Tobacco Use    Smoking status: Former     Current packs/day: 0.00     Average packs/day: 2.0 packs/day for 15.0 years (30.0 ttl pk-yrs)     Types: Cigarettes     Start date: 1970     Quit date: 1985     Years since quittin.4    Smokeless tobacco: Former     Types: Chew     Quit date: 1990   Vaping Use    Vaping Use: Never used   Substance Use Topics    Alcohol use: No     Alcohol/week: 0.0 standard drinks of alcohol     Comment: Rarely    Drug use: No      Past Surgical History:   Procedure Laterality Date    CARDIAC CATHETERIZATION  9/11/15    Dr Goff, multi vessel disease, CTS consult, Dr. Jackman    CARDIAC CATHETERIZATION  2018    Dr. Thompson- Bypass grafts patent    CARDIAC CATHETERIZATION  2018    CHOLECYSTECTOMY      COLONOSCOPY  10/23/2017    COLONOSCOPY  10/23/2017    CORONARY ARTERY BYPASS GRAFT  9/14/15    x3  Dr Jackman    HERNIA REPAIR      TONSILLECTOMY       Family History   Problem Relation Age of Onset    Other Mother     Heart Attack Father     Heart Disease Father      Past Medical History:   Diagnosis Date    Atrial

## 2024-08-20 ENCOUNTER — OFFICE VISIT (OUTPATIENT)
Dept: PRIMARY CARE CLINIC | Age: 79
End: 2024-08-20
Payer: MEDICARE

## 2024-08-20 VITALS
SYSTOLIC BLOOD PRESSURE: 136 MMHG | OXYGEN SATURATION: 99 % | HEIGHT: 74 IN | HEART RATE: 66 BPM | BODY MASS INDEX: 29.26 KG/M2 | TEMPERATURE: 97.5 F | DIASTOLIC BLOOD PRESSURE: 70 MMHG | WEIGHT: 228 LBS

## 2024-08-20 VITALS
HEIGHT: 74 IN | OXYGEN SATURATION: 99 % | HEART RATE: 66 BPM | BODY MASS INDEX: 29.26 KG/M2 | WEIGHT: 228 LBS | SYSTOLIC BLOOD PRESSURE: 136 MMHG | DIASTOLIC BLOOD PRESSURE: 70 MMHG | TEMPERATURE: 97.5 F

## 2024-08-20 DIAGNOSIS — R31.21 ASYMPTOMATIC MICROSCOPIC HEMATURIA: Primary | ICD-10-CM

## 2024-08-20 DIAGNOSIS — Z23 NEED FOR PROPHYLACTIC VACCINATION AGAINST DIPHTHERIA-TETANUS-PERTUSSIS (DTP): ICD-10-CM

## 2024-08-20 DIAGNOSIS — E78.5 HYPERLIPIDEMIA, UNSPECIFIED HYPERLIPIDEMIA TYPE: ICD-10-CM

## 2024-08-20 DIAGNOSIS — I10 ESSENTIAL HYPERTENSION: ICD-10-CM

## 2024-08-20 DIAGNOSIS — Z87.442 HISTORY OF KIDNEY STONES: ICD-10-CM

## 2024-08-20 DIAGNOSIS — Z00.00 MEDICARE ANNUAL WELLNESS VISIT, SUBSEQUENT: Primary | ICD-10-CM

## 2024-08-20 DIAGNOSIS — I25.810 CORONARY ARTERY DISEASE INVOLVING CORONARY BYPASS GRAFT OF NATIVE HEART WITHOUT ANGINA PECTORIS: ICD-10-CM

## 2024-08-20 LAB
BILIRUBIN, POC: NEGATIVE
BLOOD URINE, POC: ABNORMAL
CLARITY, POC: ABNORMAL
COLOR, POC: YELLOW
GLUCOSE URINE, POC: NEGATIVE
KETONES, POC: NEGATIVE
LEUKOCYTE EST, POC: NEGATIVE
NITRITE, POC: NEGATIVE
PH, POC: 5.5
PROTEIN, POC: NEGATIVE
SPECIFIC GRAVITY, POC: 1.02
UROBILINOGEN, POC: 0.2

## 2024-08-20 PROCEDURE — 1123F ACP DISCUSS/DSCN MKR DOCD: CPT | Performed by: FAMILY MEDICINE

## 2024-08-20 PROCEDURE — G0439 PPPS, SUBSEQ VISIT: HCPCS | Performed by: FAMILY MEDICINE

## 2024-08-20 PROCEDURE — 3075F SYST BP GE 130 - 139MM HG: CPT | Performed by: FAMILY MEDICINE

## 2024-08-20 PROCEDURE — 81002 URINALYSIS NONAUTO W/O SCOPE: CPT | Performed by: FAMILY MEDICINE

## 2024-08-20 PROCEDURE — 3078F DIAST BP <80 MM HG: CPT | Performed by: FAMILY MEDICINE

## 2024-08-20 PROCEDURE — 99214 OFFICE O/P EST MOD 30 MIN: CPT | Performed by: FAMILY MEDICINE

## 2024-08-20 SDOH — ECONOMIC STABILITY: FOOD INSECURITY: WITHIN THE PAST 12 MONTHS, THE FOOD YOU BOUGHT JUST DIDN'T LAST AND YOU DIDN'T HAVE MONEY TO GET MORE.: NEVER TRUE

## 2024-08-20 SDOH — ECONOMIC STABILITY: FOOD INSECURITY: WITHIN THE PAST 12 MONTHS, YOU WORRIED THAT YOUR FOOD WOULD RUN OUT BEFORE YOU GOT MONEY TO BUY MORE.: NEVER TRUE

## 2024-08-20 SDOH — ECONOMIC STABILITY: INCOME INSECURITY: HOW HARD IS IT FOR YOU TO PAY FOR THE VERY BASICS LIKE FOOD, HOUSING, MEDICAL CARE, AND HEATING?: NOT HARD AT ALL

## 2024-08-20 ASSESSMENT — PATIENT HEALTH QUESTIONNAIRE - PHQ9
1. LITTLE INTEREST OR PLEASURE IN DOING THINGS: NOT AT ALL
SUM OF ALL RESPONSES TO PHQ QUESTIONS 1-9: 0
2. FEELING DOWN, DEPRESSED OR HOPELESS: NOT AT ALL
SUM OF ALL RESPONSES TO PHQ QUESTIONS 1-9: 0
SUM OF ALL RESPONSES TO PHQ QUESTIONS 1-9: 0
SUM OF ALL RESPONSES TO PHQ9 QUESTIONS 1 & 2: 0
SUM OF ALL RESPONSES TO PHQ QUESTIONS 1-9: 0

## 2024-08-20 ASSESSMENT — LIFESTYLE VARIABLES
HOW OFTEN DO YOU HAVE A DRINK CONTAINING ALCOHOL: NEVER
HOW MANY STANDARD DRINKS CONTAINING ALCOHOL DO YOU HAVE ON A TYPICAL DAY: PATIENT DOES NOT DRINK

## 2024-08-20 NOTE — PROGRESS NOTES
decreased (HCC)     low platelets during the 1980's?    S/P CABG x 3 3/7/2017       Vitals:    08/20/24 0834   BP: 136/70   Pulse: 66   Temp: 97.5 °F (36.4 °C)   SpO2: 99%   Weight: 103.4 kg (228 lb)   Height: 1.88 m (6' 2\")     BP Readings from Last 3 Encounters:   08/20/24 136/70   08/20/24 136/70   07/23/24 130/80    132/70    Physical Exam  Vitals and nursing note reviewed.   Constitutional:       Appearance: He is well-developed.   HENT:      Head: Normocephalic.      Right Ear: External ear normal.      Left Ear: External ear normal.      Nose: Nose normal.   Eyes:      Conjunctiva/sclera: Conjunctivae normal.      Pupils: Pupils are equal, round, and reactive to light.   Cardiovascular:      Rate and Rhythm: Normal rate.   Pulmonary:      Breath sounds: Normal breath sounds.   Abdominal:      General: Bowel sounds are normal.      Palpations: Abdomen is soft.   Musculoskeletal:         General: Normal range of motion.      Cervical back: Normal range of motion and neck supple.   Skin:     General: Skin is warm and dry.   Neurological:      Mental Status: He is alert and oriented to person, place, and time.   Psychiatric:         Behavior: Behavior normal.     Today's vitals physical exam stable.  Heart regular lungs are clear.  Medications as prescribed.  Reassessment with me January for physical and sooner if problems.  Urine will be sent for cytology and we will notify by phone.            Plan Per Assessment:  Jakob was seen today for hypertension and hematuria.    Diagnoses and all orders for this visit:    Asymptomatic microscopic hematuria  -     POCT Urinalysis no Micro  -     Cytology, Non-Gyn    Essential hypertension  -     CBC with Auto Differential; Future  -     Comprehensive Metabolic Panel; Future  -     Lipid Panel; Future  -     TSH; Future  -     T4; Future    Coronary artery disease involving coronary bypass graft of native heart without angina pectoris  -     CBC with Auto Differential;

## 2024-08-20 NOTE — PROGRESS NOTES
Medicare Annual Wellness Visit    Jakob Carroll is here for Medicare AWV    Assessment & Plan   Medicare annual wellness visit, subsequent  -     Tdap (ADACEL) 5-2-15.5 LF-MCG/0.5 injection; Inject 0.5 mLs into the muscle once for 1 dose, Disp-0.5 mL, R-0Print  Need for prophylactic vaccination against diphtheria-tetanus-pertussis (DTP)  -     Tdap (ADACEL) 5-2-15.5 LF-MCG/0.5 injection; Inject 0.5 mLs into the muscle once for 1 dose, Disp-0.5 mL, R-0Print    Recommendations for Preventive Services Due: see orders and patient instructions/AVS.  Recommended screening schedule for the next 5-10 years is provided to the patient in written form: see Patient Instructions/AVS.     Return for Medicare Annual Wellness Visit in 1 year.     Subjective       Patient's complete Health Risk Assessment and screening values have been reviewed and are found in Flowsheets. The following problems were reviewed today and where indicated follow up appointments were made and/or referrals ordered.    Positive Risk Factor Screenings with Interventions:    Fall Risk:  Do you feel unsteady or are you worried about falling? : no  2 or more falls in past year?: no  Fall with injury in past year?: (!) yes     Interventions:    Reviewed medications, home hazards, visual acuity, and co-morbidities that can increase risk for falls  Reviewed today                     Advanced Directives:  Do you have a Living Will?: (!) No    Intervention:  Reviewed today                     Objective   Vitals:    08/20/24 0840   BP: 136/70   Pulse: 66   Temp: 97.5 °F (36.4 °C)   SpO2: 99%   Weight: 103.4 kg (228 lb)   Height: 1.88 m (6' 2\")      Body mass index is 29.27 kg/m².                No Known Allergies  Prior to Visit Medications    Medication Sig Taking? Authorizing Provider   Tdap (ADACEL) 5-2-15.5 LF-MCG/0.5 injection Inject 0.5 mLs into the muscle once for 1 dose Yes Theo Medina, DO   rosuvastatin (CRESTOR) 10 MG tablet TAKE 1 TABLET BY MOUTH

## 2024-08-21 LAB — NON-GYN CYTOLOGY REPORT: NORMAL

## 2024-08-22 PROBLEM — Z12.5 PROSTATE CANCER SCREENING: Status: RESOLVED | Noted: 2022-09-22 | Resolved: 2024-08-22

## 2024-08-22 LAB — NON-GYN CYTOLOGY REPORT: NORMAL

## 2024-08-24 NOTE — PROGRESS NOTES
Outpatient Cardiology Office Visit    Primary Cardiologist: Dr. Alarcon    Reason for Visit: Overdue annual visit      HISTORY OF PRESENT ILLNESS:   Patient is a 78-year-old male who is known to [facility] cardiology through Dr. Alarcon.  He was last seen outpatient 11/2/2021 for annual visit.  No changes made at that time.  Labs done recently including lipid panel at [facility] 8/20/2024.  Today, patient with no acute complaints.  He does report he has been noticing more fatigue for \"a while\" that he thinks may be attributed to his beta-blocker.  He reports he asked Dr. Alarcon 2 years ago about discontinuing the beta-blocker at that time due to fatigue and he was urged to continue as it is a low dose of 12.5 daily dosing.  Patient reports no worsening of fatigue discontinuation will last several years.  With history of CAD patient urged to continue small dose of beta-blockade for benefits outweigh minor fatigue if not significant impacting patient's daily life.  Patient agreeable at this time.  Physical exam reveals mild edema around bilateral ankles more so on the right.  Patient reports he has not noticed any significant swelling.  He denies chest pain, CABRAL, palpitations, orthopnea, PND, or weight gain.  He reports he regularly bikes and plays pickle ball with no exertional symptoms.      Past medical history:  CAD:  CABG x 3 9/2015: LIMA-LAD, SVG-ramus, SVG-OM  NSTEMI 11/2018 [facility]: Thrombotic proximal LAD prior to moderate size diagonal branch followed by 100% mid LAD stenosis, 60% PLV branch stenosis and patent grafts.  No PCI was attempted due to thrombotic proximal LAD.  Diastolic dysfunction/VHD:  TTE 9/2015: EF 65%.  Mild concentric LVH.  Normal RV size and function.  No VHD.  TTE 3/2019: EF 50-55%.  Stage I DD.  Normal RV size and function.  Normal atria.  Trace MR.  Mild TR.  RVSP 26 mmHg.  RBBB  Hypertension  Hyperlipidemia  Hypothyroidism  Borderline diabetes  Crohn's disease  History of thrombocytopenia  Chronic

## 2024-08-27 ENCOUNTER — OFFICE VISIT (OUTPATIENT)
Dept: CARDIOLOGY CLINIC | Age: 79
End: 2024-08-27
Payer: MEDICARE

## 2024-08-27 VITALS
WEIGHT: 225 LBS | HEIGHT: 74 IN | HEART RATE: 60 BPM | SYSTOLIC BLOOD PRESSURE: 128 MMHG | RESPIRATION RATE: 18 BRPM | BODY MASS INDEX: 28.88 KG/M2 | DIASTOLIC BLOOD PRESSURE: 72 MMHG

## 2024-08-27 DIAGNOSIS — I10 PRIMARY HYPERTENSION: ICD-10-CM

## 2024-08-27 DIAGNOSIS — I25.119 CORONARY ARTERY DISEASE INVOLVING NATIVE CORONARY ARTERY OF NATIVE HEART WITH ANGINA PECTORIS (HCC): Primary | ICD-10-CM

## 2024-08-27 DIAGNOSIS — R53.82 CHRONIC FATIGUE: ICD-10-CM

## 2024-08-27 DIAGNOSIS — E78.5 HYPERLIPIDEMIA, UNSPECIFIED HYPERLIPIDEMIA TYPE: ICD-10-CM

## 2024-08-27 PROCEDURE — 93000 ELECTROCARDIOGRAM COMPLETE: CPT

## 2024-08-27 PROCEDURE — 3074F SYST BP LT 130 MM HG: CPT

## 2024-08-27 PROCEDURE — 1123F ACP DISCUSS/DSCN MKR DOCD: CPT

## 2024-08-27 PROCEDURE — 99214 OFFICE O/P EST MOD 30 MIN: CPT

## 2024-08-27 PROCEDURE — 3078F DIAST BP <80 MM HG: CPT

## 2024-12-05 RX ORDER — METOPROLOL SUCCINATE 25 MG/1
12.5 TABLET, EXTENDED RELEASE ORAL DAILY
Qty: 45 TABLET | Refills: 1 | Status: SHIPPED | OUTPATIENT
Start: 2024-12-05

## 2024-12-05 RX ORDER — ROSUVASTATIN CALCIUM 10 MG/1
10 TABLET, COATED ORAL DAILY
Qty: 90 TABLET | Refills: 1 | Status: SHIPPED | OUTPATIENT
Start: 2024-12-05

## 2025-01-14 DIAGNOSIS — E03.9 HYPOTHYROIDISM, UNSPECIFIED TYPE: ICD-10-CM

## 2025-01-14 DIAGNOSIS — I25.810 CORONARY ARTERY DISEASE INVOLVING CORONARY BYPASS GRAFT OF NATIVE HEART WITHOUT ANGINA PECTORIS: ICD-10-CM

## 2025-01-14 DIAGNOSIS — R73.01 IMPAIRED FASTING GLUCOSE: ICD-10-CM

## 2025-01-14 DIAGNOSIS — E78.5 HYPERLIPIDEMIA, UNSPECIFIED HYPERLIPIDEMIA TYPE: ICD-10-CM

## 2025-01-14 DIAGNOSIS — Z95.1 S/P CABG X 3: ICD-10-CM

## 2025-01-14 DIAGNOSIS — I10 ESSENTIAL HYPERTENSION: ICD-10-CM

## 2025-01-14 LAB
ALBUMIN: 3.8 G/DL (ref 3.5–5.2)
ALP BLD-CCNC: 96 U/L (ref 40–129)
ALT SERPL-CCNC: 14 U/L (ref 0–40)
ANION GAP SERPL CALCULATED.3IONS-SCNC: 12 MMOL/L (ref 7–16)
AST SERPL-CCNC: 21 U/L (ref 0–39)
BASOPHILS ABSOLUTE: 0.04 K/UL (ref 0–0.2)
BASOPHILS RELATIVE PERCENT: 1 % (ref 0–2)
BILIRUB SERPL-MCNC: 0.4 MG/DL (ref 0–1.2)
BUN BLDV-MCNC: 15 MG/DL (ref 6–23)
CALCIUM SERPL-MCNC: 9.5 MG/DL (ref 8.6–10.2)
CHLORIDE BLD-SCNC: 108 MMOL/L (ref 98–107)
CHOLESTEROL, TOTAL: 159 MG/DL
CO2: 22 MMOL/L (ref 22–29)
CREAT SERPL-MCNC: 1.2 MG/DL (ref 0.7–1.2)
EOSINOPHILS ABSOLUTE: 0.07 K/UL (ref 0.05–0.5)
EOSINOPHILS RELATIVE PERCENT: 1 % (ref 0–6)
GFR, ESTIMATED: 60 ML/MIN/1.73M2
GLUCOSE BLD-MCNC: 106 MG/DL (ref 74–99)
HBA1C MFR BLD: 5.6 % (ref 4–5.6)
HCT VFR BLD CALC: 46.1 % (ref 37–54)
HDLC SERPL-MCNC: 38 MG/DL
HEMOGLOBIN: 15 G/DL (ref 12.5–16.5)
IMMATURE GRANULOCYTES %: 0 % (ref 0–5)
IMMATURE GRANULOCYTES ABSOLUTE: <0.03 K/UL (ref 0–0.58)
LDL CHOLESTEROL: 88 MG/DL
LYMPHOCYTES ABSOLUTE: 1.91 K/UL (ref 1.5–4)
LYMPHOCYTES RELATIVE PERCENT: 31 % (ref 20–42)
MCH RBC QN AUTO: 31.4 PG (ref 26–35)
MCHC RBC AUTO-ENTMCNC: 32.5 G/DL (ref 32–34.5)
MCV RBC AUTO: 96.6 FL (ref 80–99.9)
MONOCYTES ABSOLUTE: 0.54 K/UL (ref 0.1–0.95)
MONOCYTES RELATIVE PERCENT: 9 % (ref 2–12)
NEUTROPHILS ABSOLUTE: 3.67 K/UL (ref 1.8–7.3)
NEUTROPHILS RELATIVE PERCENT: 59 % (ref 43–80)
PDW BLD-RTO: 12.5 % (ref 11.5–15)
PLATELET # BLD: 192 K/UL (ref 130–450)
PMV BLD AUTO: 10.3 FL (ref 7–12)
POTASSIUM SERPL-SCNC: 5.1 MMOL/L (ref 3.5–5)
RBC # BLD: 4.77 M/UL (ref 3.8–5.8)
SODIUM BLD-SCNC: 142 MMOL/L (ref 132–146)
THYROXINE (T4): 7.6 UG/DL (ref 4.5–11.7)
TOTAL PROTEIN: 6.8 G/DL (ref 6.4–8.3)
TRIGL SERPL-MCNC: 166 MG/DL
TSH SERPL DL<=0.05 MIU/L-ACNC: 1.28 UIU/ML (ref 0.27–4.2)
VLDLC SERPL CALC-MCNC: 33 MG/DL
WBC # BLD: 6.2 K/UL (ref 4.5–11.5)

## 2025-01-20 ENCOUNTER — OFFICE VISIT (OUTPATIENT)
Dept: PRIMARY CARE CLINIC | Age: 80
End: 2025-01-20
Payer: MEDICARE

## 2025-01-20 VITALS
DIASTOLIC BLOOD PRESSURE: 80 MMHG | SYSTOLIC BLOOD PRESSURE: 130 MMHG | OXYGEN SATURATION: 98 % | TEMPERATURE: 97.6 F | HEART RATE: 64 BPM | WEIGHT: 225 LBS | BODY MASS INDEX: 28.88 KG/M2 | HEIGHT: 74 IN

## 2025-01-20 VITALS
TEMPERATURE: 97.6 F | DIASTOLIC BLOOD PRESSURE: 80 MMHG | OXYGEN SATURATION: 98 % | WEIGHT: 225 LBS | SYSTOLIC BLOOD PRESSURE: 130 MMHG | HEIGHT: 74 IN | BODY MASS INDEX: 28.88 KG/M2 | HEART RATE: 64 BPM

## 2025-01-20 DIAGNOSIS — M25.562 ACUTE PAIN OF LEFT KNEE: ICD-10-CM

## 2025-01-20 DIAGNOSIS — Z00.00 MEDICARE ANNUAL WELLNESS VISIT, SUBSEQUENT: Primary | ICD-10-CM

## 2025-01-20 DIAGNOSIS — E03.9 HYPOTHYROIDISM, UNSPECIFIED TYPE: ICD-10-CM

## 2025-01-20 DIAGNOSIS — R73.01 IMPAIRED FASTING GLUCOSE: ICD-10-CM

## 2025-01-20 DIAGNOSIS — E78.5 HYPERLIPIDEMIA, UNSPECIFIED HYPERLIPIDEMIA TYPE: ICD-10-CM

## 2025-01-20 DIAGNOSIS — I25.810 CORONARY ARTERY DISEASE INVOLVING CORONARY BYPASS GRAFT OF NATIVE HEART WITHOUT ANGINA PECTORIS: ICD-10-CM

## 2025-01-20 DIAGNOSIS — Z95.1 S/P CABG X 3: ICD-10-CM

## 2025-01-20 DIAGNOSIS — I10 ESSENTIAL HYPERTENSION: Primary | ICD-10-CM

## 2025-01-20 DIAGNOSIS — K50.90 CROHN'S DISEASE IN REMISSION (HCC): ICD-10-CM

## 2025-01-20 DIAGNOSIS — Z87.442 HISTORY OF KIDNEY STONES: ICD-10-CM

## 2025-01-20 PROCEDURE — G8428 CUR MEDS NOT DOCUMENT: HCPCS | Performed by: FAMILY MEDICINE

## 2025-01-20 PROCEDURE — 1159F MED LIST DOCD IN RCRD: CPT | Performed by: FAMILY MEDICINE

## 2025-01-20 PROCEDURE — 1123F ACP DISCUSS/DSCN MKR DOCD: CPT | Performed by: FAMILY MEDICINE

## 2025-01-20 PROCEDURE — 1036F TOBACCO NON-USER: CPT | Performed by: FAMILY MEDICINE

## 2025-01-20 PROCEDURE — G0439 PPPS, SUBSEQ VISIT: HCPCS | Performed by: FAMILY MEDICINE

## 2025-01-20 PROCEDURE — G8419 CALC BMI OUT NRM PARAM NOF/U: HCPCS | Performed by: FAMILY MEDICINE

## 2025-01-20 PROCEDURE — 99214 OFFICE O/P EST MOD 30 MIN: CPT | Performed by: FAMILY MEDICINE

## 2025-01-20 PROCEDURE — 3075F SYST BP GE 130 - 139MM HG: CPT | Performed by: FAMILY MEDICINE

## 2025-01-20 PROCEDURE — 3079F DIAST BP 80-89 MM HG: CPT | Performed by: FAMILY MEDICINE

## 2025-01-20 RX ORDER — LEVOTHYROXINE SODIUM 75 UG/1
TABLET ORAL
Qty: 90 TABLET | Refills: 3 | Status: SHIPPED
Start: 2025-01-20 | End: 2025-01-21 | Stop reason: SDUPTHER

## 2025-01-20 RX ORDER — RESPIRATORY SYNCYTIAL VISUS VACCINE RECOMBINANT, ADJUVANTED 120MCG/0.5
0.5 KIT INTRAMUSCULAR ONCE
Qty: 0.5 ML | Refills: 0 | Status: SHIPPED | OUTPATIENT
Start: 2025-01-20 | End: 2025-01-20

## 2025-01-20 SDOH — ECONOMIC STABILITY: FOOD INSECURITY: WITHIN THE PAST 12 MONTHS, YOU WORRIED THAT YOUR FOOD WOULD RUN OUT BEFORE YOU GOT MONEY TO BUY MORE.: NEVER TRUE

## 2025-01-20 SDOH — ECONOMIC STABILITY: FOOD INSECURITY: WITHIN THE PAST 12 MONTHS, THE FOOD YOU BOUGHT JUST DIDN'T LAST AND YOU DIDN'T HAVE MONEY TO GET MORE.: NEVER TRUE

## 2025-01-20 ASSESSMENT — PATIENT HEALTH QUESTIONNAIRE - PHQ9
SUM OF ALL RESPONSES TO PHQ QUESTIONS 1-9: 0
SUM OF ALL RESPONSES TO PHQ QUESTIONS 1-9: 0
1. LITTLE INTEREST OR PLEASURE IN DOING THINGS: NOT AT ALL
SUM OF ALL RESPONSES TO PHQ QUESTIONS 1-9: 0
SUM OF ALL RESPONSES TO PHQ QUESTIONS 1-9: 0
2. FEELING DOWN, DEPRESSED OR HOPELESS: NOT AT ALL
SUM OF ALL RESPONSES TO PHQ9 QUESTIONS 1 & 2: 0

## 2025-01-20 NOTE — PROGRESS NOTES
Provider  Hang Goff MD as Consulting Physician (Cardiology)  Tenzin Jackman DO as Surgeon (Cardiothoracic Surgery)     Recommendations for Preventive Services Due: see orders and patient instructions/AVS.  Recommended screening schedule for the next 5-10 years is provided to the patient in written form: see Patient Instructions/AVS.     Reviewed and updated this visit:  Meds  Med Hx  Surg Hx  Soc Hx  Fam Hx

## 2025-01-20 NOTE — PROGRESS NOTES
25     Jakob Carroll    : 1945 Sex: male   Age: 79 y.o.      Chief Complaint   Patient presents with    Hypertension    Knee Injury       Prior to Admission medications    Medication Sig Start Date End Date Taking? Authorizing Provider   levothyroxine (SYNTHROID) 75 MCG tablet TAKE 1 TABLET DAILY 25  Yes Theo Medina DO   rosuvastatin (CRESTOR) 10 MG tablet TAKE 1 TABLET BY MOUTH DAILY 24   Theo Medina DO   metoprolol succinate (TOPROL XL) 25 MG extended release tablet TAKE ONE-HALF TABLET BY MOUTH  DAILY 24   Theo Medina DO   vitamin D (ERGOCALCIFEROL) 1.25 MG (02871 UT) CAPS capsule TAKE 1 CAPSULE BY MOUTH ONE TIME PER WEEK 1/15/24   Theo Medina DO   metroNIDAZOLE (METROCREAM) 0.75 % cream APPLY TO FACE EVERY DAY AT BEDTIME 21   Marcus Olivas MD   aspirin 81 MG tablet Take by mouth 11/30/15   Marcus Olivas MD          HPI: Patient evaluated today with hypertension Crohn's disease coronary artery disease hyperlipidemia hypothyroid and impaired fasting glucose.  Medically overall doing well with current medications.  Systems review stable.  Cardiac status remains stable.  Did have a complaint of left knee discomfort mild strain and sprain questionable meniscal involvement.  Able to ambulate without difficulty.  Pain seems to be improving and he prefers no further workup unless things progress.  Will follow-up if further problems.          Review of Systems   Constitutional: Negative.    HENT: Negative.     Eyes: Negative.    Respiratory: Negative.     Gastrointestinal: Negative.    Endocrine: Negative.    Genitourinary: Negative.    Musculoskeletal:  Positive for arthralgias.   Skin: Negative.    Allergic/Immunologic: Negative.    Hematological: Negative.    Psychiatric/Behavioral: Negative.                Current Outpatient Medications:     levothyroxine (SYNTHROID) 75 MCG tablet, TAKE 1 TABLET DAILY, Disp: 90 tablet, Rfl: 3

## 2025-01-21 RX ORDER — LEVOTHYROXINE SODIUM 75 UG/1
TABLET ORAL
Qty: 90 TABLET | Refills: 3 | Status: SHIPPED | OUTPATIENT
Start: 2025-01-21

## 2025-01-27 RX ORDER — ERGOCALCIFEROL 1.25 MG/1
CAPSULE, LIQUID FILLED ORAL
Qty: 12 CAPSULE | Refills: 1 | Status: SHIPPED | OUTPATIENT
Start: 2025-01-27

## 2025-04-21 ENCOUNTER — OFFICE VISIT (OUTPATIENT)
Dept: PRIMARY CARE CLINIC | Age: 80
End: 2025-04-21
Payer: MEDICARE

## 2025-04-21 VITALS
DIASTOLIC BLOOD PRESSURE: 76 MMHG | SYSTOLIC BLOOD PRESSURE: 130 MMHG | HEIGHT: 74 IN | OXYGEN SATURATION: 98 % | BODY MASS INDEX: 28.75 KG/M2 | HEART RATE: 68 BPM | TEMPERATURE: 97.7 F | WEIGHT: 224 LBS

## 2025-04-21 DIAGNOSIS — Z95.1 S/P CABG X 3: ICD-10-CM

## 2025-04-21 DIAGNOSIS — E03.9 HYPOTHYROIDISM, UNSPECIFIED TYPE: ICD-10-CM

## 2025-04-21 DIAGNOSIS — E78.5 HYPERLIPIDEMIA, UNSPECIFIED HYPERLIPIDEMIA TYPE: ICD-10-CM

## 2025-04-21 DIAGNOSIS — R73.01 IMPAIRED FASTING GLUCOSE: ICD-10-CM

## 2025-04-21 DIAGNOSIS — I25.810 CORONARY ARTERY DISEASE INVOLVING CORONARY BYPASS GRAFT OF NATIVE HEART WITHOUT ANGINA PECTORIS: ICD-10-CM

## 2025-04-21 DIAGNOSIS — I10 ESSENTIAL HYPERTENSION: Primary | ICD-10-CM

## 2025-04-21 PROCEDURE — 1036F TOBACCO NON-USER: CPT | Performed by: FAMILY MEDICINE

## 2025-04-21 PROCEDURE — G8419 CALC BMI OUT NRM PARAM NOF/U: HCPCS | Performed by: FAMILY MEDICINE

## 2025-04-21 PROCEDURE — G8427 DOCREV CUR MEDS BY ELIG CLIN: HCPCS | Performed by: FAMILY MEDICINE

## 2025-04-21 PROCEDURE — 3078F DIAST BP <80 MM HG: CPT | Performed by: FAMILY MEDICINE

## 2025-04-21 PROCEDURE — 1123F ACP DISCUSS/DSCN MKR DOCD: CPT | Performed by: FAMILY MEDICINE

## 2025-04-21 PROCEDURE — 3075F SYST BP GE 130 - 139MM HG: CPT | Performed by: FAMILY MEDICINE

## 2025-04-21 PROCEDURE — 1159F MED LIST DOCD IN RCRD: CPT | Performed by: FAMILY MEDICINE

## 2025-04-21 PROCEDURE — 99214 OFFICE O/P EST MOD 30 MIN: CPT | Performed by: FAMILY MEDICINE

## 2025-04-21 ASSESSMENT — ENCOUNTER SYMPTOMS
GASTROINTESTINAL NEGATIVE: 1
RESPIRATORY NEGATIVE: 1
EYES NEGATIVE: 1
ALLERGIC/IMMUNOLOGIC NEGATIVE: 1

## 2025-04-21 NOTE — PROGRESS NOTES
decreased     low platelets during the 1980's?    S/P CABG x 3 3/7/2017       Vitals:    04/21/25 0918   BP: 130/76   Pulse: 68   Temp: 97.7 °F (36.5 °C)   SpO2: 98%   Weight: 101.6 kg (224 lb)   Height: 1.88 m (6' 2\")     BP Readings from Last 3 Encounters:   04/21/25 130/76   01/20/25 130/80   01/20/25 130/80    128/70    Physical Exam  Vitals and nursing note reviewed.   Constitutional:       Appearance: He is well-developed.   HENT:      Head: Normocephalic.      Right Ear: External ear normal.      Left Ear: External ear normal.      Nose: Nose normal.   Eyes:      Conjunctiva/sclera: Conjunctivae normal.      Pupils: Pupils are equal, round, and reactive to light.   Cardiovascular:      Rate and Rhythm: Normal rate.   Pulmonary:      Breath sounds: Normal breath sounds.   Abdominal:      General: Bowel sounds are normal.      Palpations: Abdomen is soft.   Musculoskeletal:         General: Normal range of motion.      Cervical back: Normal range of motion and neck supple.   Skin:     General: Skin is warm and dry.   Neurological:      Mental Status: He is alert and oriented to person, place, and time.   Psychiatric:         Behavior: Behavior normal.     Today's vitals physical examination all stable.  Heart regular lungs clear.  Maintain current meds and care.  Follow-up visit with me 3 months blood work at that time.            Plan Per Assessment:  Jakob was seen today for hypertension and follow-up.    Diagnoses and all orders for this visit:    Essential hypertension  -     CBC with Auto Differential; Future  -     Comprehensive Metabolic Panel; Future  -     T4; Future  -     Lipid Panel; Future  -     TSH; Future  -     Hemoglobin A1C; Future    Coronary artery disease involving coronary bypass graft of native heart without angina pectoris  -     CBC with Auto Differential; Future  -     Comprehensive Metabolic Panel; Future  -     T4; Future  -     Lipid Panel; Future  -     TSH; Future  -

## 2025-04-30 RX ORDER — METOPROLOL SUCCINATE 25 MG/1
12.5 TABLET, EXTENDED RELEASE ORAL DAILY
Qty: 45 TABLET | Refills: 1 | Status: SHIPPED | OUTPATIENT
Start: 2025-04-30

## 2025-04-30 RX ORDER — ROSUVASTATIN CALCIUM 10 MG/1
10 TABLET, COATED ORAL DAILY
Qty: 90 TABLET | Refills: 1 | Status: SHIPPED | OUTPATIENT
Start: 2025-04-30

## 2025-07-16 DIAGNOSIS — I25.810 CORONARY ARTERY DISEASE INVOLVING CORONARY BYPASS GRAFT OF NATIVE HEART WITHOUT ANGINA PECTORIS: ICD-10-CM

## 2025-07-16 DIAGNOSIS — I10 ESSENTIAL HYPERTENSION: ICD-10-CM

## 2025-07-16 DIAGNOSIS — R73.01 IMPAIRED FASTING GLUCOSE: ICD-10-CM

## 2025-07-16 DIAGNOSIS — E78.5 HYPERLIPIDEMIA, UNSPECIFIED HYPERLIPIDEMIA TYPE: ICD-10-CM

## 2025-07-16 DIAGNOSIS — E03.9 HYPOTHYROIDISM, UNSPECIFIED TYPE: ICD-10-CM

## 2025-07-16 LAB
ALBUMIN: 3.8 G/DL (ref 3.5–5.2)
ALP BLD-CCNC: 90 U/L (ref 40–129)
ALT SERPL-CCNC: 14 U/L (ref 0–50)
ANION GAP SERPL CALCULATED.3IONS-SCNC: 9 MMOL/L (ref 7–16)
AST SERPL-CCNC: 28 U/L (ref 0–50)
BASOPHILS ABSOLUTE: 0.03 K/UL (ref 0–0.2)
BASOPHILS RELATIVE PERCENT: 0 % (ref 0–2)
BILIRUB SERPL-MCNC: 0.4 MG/DL (ref 0–1.2)
BUN BLDV-MCNC: 19 MG/DL (ref 8–23)
CALCIUM SERPL-MCNC: 9.4 MG/DL (ref 8.8–10.2)
CHLORIDE BLD-SCNC: 107 MMOL/L (ref 98–107)
CHOLESTEROL, TOTAL: 165 MG/DL
CO2: 24 MMOL/L (ref 22–29)
CREAT SERPL-MCNC: 1.3 MG/DL (ref 0.7–1.2)
EOSINOPHILS ABSOLUTE: 0.1 K/UL (ref 0.05–0.5)
EOSINOPHILS RELATIVE PERCENT: 2 % (ref 0–6)
GFR, ESTIMATED: 57 ML/MIN/1.73M2
GLUCOSE BLD-MCNC: 100 MG/DL (ref 74–99)
HBA1C MFR BLD: 5.5 % (ref 4–5.6)
HCT VFR BLD CALC: 45.3 % (ref 37–54)
HDLC SERPL-MCNC: 34 MG/DL
HEMOGLOBIN: 15.1 G/DL (ref 12.5–16.5)
IMMATURE GRANULOCYTES %: 0 % (ref 0–5)
IMMATURE GRANULOCYTES ABSOLUTE: <0.03 K/UL (ref 0–0.58)
LDL CHOLESTEROL: 88 MG/DL
LYMPHOCYTES ABSOLUTE: 2.19 K/UL (ref 1.5–4)
LYMPHOCYTES RELATIVE PERCENT: 33 % (ref 20–42)
MCH RBC QN AUTO: 32.5 PG (ref 26–35)
MCHC RBC AUTO-ENTMCNC: 33.3 G/DL (ref 32–34.5)
MCV RBC AUTO: 97.6 FL (ref 80–99.9)
MONOCYTES ABSOLUTE: 0.59 K/UL (ref 0.1–0.95)
MONOCYTES RELATIVE PERCENT: 9 % (ref 2–12)
NEUTROPHILS ABSOLUTE: 3.77 K/UL (ref 1.8–7.3)
NEUTROPHILS RELATIVE PERCENT: 56 % (ref 43–80)
PDW BLD-RTO: 13 % (ref 11.5–15)
PLATELET # BLD: 183 K/UL (ref 130–450)
PMV BLD AUTO: 10.4 FL (ref 7–12)
POTASSIUM SERPL-SCNC: 4.6 MMOL/L (ref 3.5–5.1)
RBC # BLD: 4.64 M/UL (ref 3.8–5.8)
SODIUM BLD-SCNC: 141 MMOL/L (ref 136–145)
THYROXINE (T4): 5.7 UG/DL (ref 4.5–11.7)
TOTAL PROTEIN: 6.7 G/DL (ref 6.4–8.3)
TRIGL SERPL-MCNC: 214 MG/DL
TSH SERPL DL<=0.05 MIU/L-ACNC: 1.24 UIU/ML (ref 0.27–4.2)
VLDLC SERPL CALC-MCNC: 43 MG/DL
WBC # BLD: 6.7 K/UL (ref 4.5–11.5)

## 2025-07-22 ENCOUNTER — OFFICE VISIT (OUTPATIENT)
Dept: PRIMARY CARE CLINIC | Age: 80
End: 2025-07-22
Payer: MEDICARE

## 2025-07-22 VITALS
BODY MASS INDEX: 28.49 KG/M2 | HEIGHT: 74 IN | SYSTOLIC BLOOD PRESSURE: 122 MMHG | OXYGEN SATURATION: 97 % | WEIGHT: 222 LBS | TEMPERATURE: 97.8 F | HEART RATE: 62 BPM | DIASTOLIC BLOOD PRESSURE: 68 MMHG

## 2025-07-22 DIAGNOSIS — E78.5 HYPERLIPIDEMIA, UNSPECIFIED HYPERLIPIDEMIA TYPE: ICD-10-CM

## 2025-07-22 DIAGNOSIS — I10 ESSENTIAL HYPERTENSION: Primary | ICD-10-CM

## 2025-07-22 DIAGNOSIS — Z95.1 S/P CABG X 3: ICD-10-CM

## 2025-07-22 DIAGNOSIS — R73.01 IMPAIRED FASTING GLUCOSE: ICD-10-CM

## 2025-07-22 DIAGNOSIS — I25.810 CORONARY ARTERY DISEASE INVOLVING CORONARY BYPASS GRAFT OF NATIVE HEART WITHOUT ANGINA PECTORIS: ICD-10-CM

## 2025-07-22 DIAGNOSIS — E03.9 HYPOTHYROIDISM, UNSPECIFIED TYPE: ICD-10-CM

## 2025-07-22 PROCEDURE — 3074F SYST BP LT 130 MM HG: CPT | Performed by: FAMILY MEDICINE

## 2025-07-22 PROCEDURE — 1036F TOBACCO NON-USER: CPT | Performed by: FAMILY MEDICINE

## 2025-07-22 PROCEDURE — 3078F DIAST BP <80 MM HG: CPT | Performed by: FAMILY MEDICINE

## 2025-07-22 PROCEDURE — G8419 CALC BMI OUT NRM PARAM NOF/U: HCPCS | Performed by: FAMILY MEDICINE

## 2025-07-22 PROCEDURE — 1159F MED LIST DOCD IN RCRD: CPT | Performed by: FAMILY MEDICINE

## 2025-07-22 PROCEDURE — G8427 DOCREV CUR MEDS BY ELIG CLIN: HCPCS | Performed by: FAMILY MEDICINE

## 2025-07-22 PROCEDURE — 1123F ACP DISCUSS/DSCN MKR DOCD: CPT | Performed by: FAMILY MEDICINE

## 2025-07-22 PROCEDURE — 99214 OFFICE O/P EST MOD 30 MIN: CPT | Performed by: FAMILY MEDICINE

## 2025-07-22 ASSESSMENT — ENCOUNTER SYMPTOMS
RESPIRATORY NEGATIVE: 1
EYES NEGATIVE: 1
GASTROINTESTINAL NEGATIVE: 1
ALLERGIC/IMMUNOLOGIC NEGATIVE: 1

## 2025-07-22 NOTE — PROGRESS NOTES
25     Jakob Carroll    : 1945 Sex: male   Age: 79 y.o.      Chief Complaint   Patient presents with    Hypertension    Discuss Labs       Prior to Admission medications    Medication Sig Start Date End Date Taking? Authorizing Provider   metoprolol succinate (TOPROL XL) 25 MG extended release tablet TAKE ONE-HALF TABLET BY MOUTH  DAILY 25  Yes Theo Medina DO   rosuvastatin (CRESTOR) 10 MG tablet TAKE 1 TABLET BY MOUTH DAILY 25  Yes Theo Medina DO   vitamin D (ERGOCALCIFEROL) 1.25 MG (08750 UT) CAPS capsule TAKE 1 CAPSULE BY MOUTH ONE TIME PER WEEK 25  Yes Theo Medina DO   levothyroxine (SYNTHROID) 75 MCG tablet TAKE 1 TABLET DAILY 25  Yes Theo Medina DO   metroNIDAZOLE (METROCREAM) 0.75 % cream APPLY TO FACE EVERY DAY AT BEDTIME 21  Yes ProviderMarcus MD   aspirin 81 MG tablet Take by mouth 11/30/15  Yes Provider, MD Marcus          HPI: Patient evaluated today with hypertension coronary artery disease hyperlipidemia hypothyroid impaired fasting glucose.  Medically overall doing well but he was not taking his rosuvastatin as prescribed so I have encouraged him to resume.  We will plan labs again then when he returns.  Clinically otherwise doing very well.          Review of Systems   Constitutional: Negative.    HENT: Negative.     Eyes: Negative.    Respiratory: Negative.     Gastrointestinal: Negative.    Endocrine: Negative.    Genitourinary: Negative.    Musculoskeletal: Negative.    Skin: Negative.    Allergic/Immunologic: Negative.    Neurological: Negative.    Hematological: Negative.    Psychiatric/Behavioral: Negative.                Current Outpatient Medications:     metoprolol succinate (TOPROL XL) 25 MG extended release tablet, TAKE ONE-HALF TABLET BY MOUTH  DAILY, Disp: 45 tablet, Rfl: 1    rosuvastatin (CRESTOR) 10 MG tablet, TAKE 1 TABLET BY MOUTH DAILY, Disp: 90 tablet, Rfl: 1    vitamin D (ERGOCALCIFEROL) 1.25 MG

## 2025-08-20 RX ORDER — ERGOCALCIFEROL 1.25 MG/1
50000 CAPSULE, LIQUID FILLED ORAL WEEKLY
Qty: 12 CAPSULE | Refills: 1 | Status: SHIPPED | OUTPATIENT
Start: 2025-08-20